# Patient Record
Sex: FEMALE | Race: WHITE | NOT HISPANIC OR LATINO | Employment: OTHER | URBAN - METROPOLITAN AREA
[De-identification: names, ages, dates, MRNs, and addresses within clinical notes are randomized per-mention and may not be internally consistent; named-entity substitution may affect disease eponyms.]

---

## 2017-01-04 ENCOUNTER — HOSPITAL ENCOUNTER (OUTPATIENT)
Dept: GENERAL RADIOLOGY | Facility: HOSPITAL | Age: 57
Discharge: HOME | End: 2017-01-04
Attending: INTERNAL MEDICINE | Admitting: INTERNAL MEDICINE

## 2017-01-04 ENCOUNTER — TRANSCRIBE ORDERS (OUTPATIENT)
Dept: ADMINISTRATIVE | Facility: HOSPITAL | Age: 57
End: 2017-01-04

## 2017-01-04 DIAGNOSIS — M25.551 RIGHT HIP PAIN: ICD-10-CM

## 2017-01-04 DIAGNOSIS — M25.551 RIGHT HIP PAIN: Primary | ICD-10-CM

## 2017-01-04 PROCEDURE — 73501 X-RAY EXAM HIP UNI 1 VIEW: CPT

## 2017-08-21 ENCOUNTER — LAB (OUTPATIENT)
Dept: LAB | Facility: HOSPITAL | Age: 57
End: 2017-08-21

## 2017-08-21 ENCOUNTER — HOSPITAL ENCOUNTER (OUTPATIENT)
Dept: CT IMAGING | Facility: HOSPITAL | Age: 57
Discharge: HOME | End: 2017-08-21
Admitting: NURSE PRACTITIONER

## 2017-08-21 DIAGNOSIS — C50.211 MALIGNANT NEOPLASM OF UPPER-INNER QUADRANT OF RIGHT FEMALE BREAST (HCC): ICD-10-CM

## 2017-08-21 DIAGNOSIS — R91.1 LUNG NODULE: ICD-10-CM

## 2017-08-21 LAB
ALBUMIN SERPL-MCNC: 3.8 G/DL (ref 3.2–4.8)
ALBUMIN/GLOB SERPL: 1.7 G/DL (ref 1.5–2.5)
ALP SERPL-CCNC: 66 U/L (ref 25–100)
ALT SERPL W P-5'-P-CCNC: 17 U/L (ref 7–40)
ANION GAP SERPL CALCULATED.3IONS-SCNC: 4 MMOL/L (ref 3–11)
AST SERPL-CCNC: 20 U/L (ref 0–33)
BILIRUB SERPL-MCNC: 0.5 MG/DL (ref 0.3–1.2)
BUN BLD-MCNC: 21 MG/DL (ref 9–23)
BUN/CREAT SERPL: 30 (ref 7–25)
CALCIUM SPEC-SCNC: 8.8 MG/DL (ref 8.7–10.4)
CHLORIDE SERPL-SCNC: 112 MMOL/L (ref 99–109)
CO2 SERPL-SCNC: 28 MMOL/L (ref 20–31)
CREAT BLD-MCNC: 0.7 MG/DL (ref 0.6–1.3)
GFR SERPL CREATININE-BSD FRML MDRD: 87 ML/MIN/1.73
GLOBULIN UR ELPH-MCNC: 2.3 GM/DL
GLUCOSE BLD-MCNC: 80 MG/DL (ref 70–100)
POTASSIUM BLD-SCNC: 4.1 MMOL/L (ref 3.5–5.5)
PROT SERPL-MCNC: 6.1 G/DL (ref 5.7–8.2)
SODIUM BLD-SCNC: 144 MMOL/L (ref 132–146)

## 2017-08-21 PROCEDURE — 82565 ASSAY OF CREATININE: CPT

## 2017-08-21 PROCEDURE — 80053 COMPREHEN METABOLIC PANEL: CPT | Performed by: NURSE PRACTITIONER

## 2017-08-21 PROCEDURE — 71260 CT THORAX DX C+: CPT

## 2017-08-21 PROCEDURE — 0 IOPAMIDOL 61 % SOLUTION: Performed by: NURSE PRACTITIONER

## 2017-08-21 PROCEDURE — 36415 COLL VENOUS BLD VENIPUNCTURE: CPT

## 2017-08-21 RX ADMIN — IOPAMIDOL 75 ML: 612 INJECTION, SOLUTION INTRAVENOUS at 08:50

## 2017-08-22 LAB — CREAT BLDA-MCNC: 0.8 MG/DL (ref 0.6–1.3)

## 2017-08-28 ENCOUNTER — OFFICE VISIT (OUTPATIENT)
Dept: ONCOLOGY | Facility: CLINIC | Age: 57
End: 2017-08-28

## 2017-08-28 VITALS
BODY MASS INDEX: 27.88 KG/M2 | WEIGHT: 178 LBS | DIASTOLIC BLOOD PRESSURE: 65 MMHG | SYSTOLIC BLOOD PRESSURE: 129 MMHG | RESPIRATION RATE: 17 BRPM | HEART RATE: 76 BPM | TEMPERATURE: 98 F

## 2017-08-28 DIAGNOSIS — C50.211 MALIGNANT NEOPLASM OF UPPER-INNER QUADRANT OF RIGHT FEMALE BREAST (HCC): Primary | ICD-10-CM

## 2017-08-28 PROCEDURE — 99214 OFFICE O/P EST MOD 30 MIN: CPT | Performed by: INTERNAL MEDICINE

## 2017-08-28 NOTE — PROGRESS NOTES
CHIEF COMPLAINT: Dizzy with headache  Problem List:  Oncology/Hematology History    1. Clinical stage IIB T2N1 greater than 2 cm primary, ER positive, UT  negative, HER-2/antonio 3+ breast cancer diagnosed on abnormal mammogram 11/21/2013  with biopsy that day. Received neoadjuvant dose dense Adriamycin and Cytoxan  starting 12/23/2013. Despite the use of Aloxi, Emend, Zofran, and Phenergan  she had significant and protracted nausea and vomiting that was quite  debilitating along with migraine headaches with severe visual scotoma. This  occurred after her 1st course of therapy on 12/23/2013, again after her 2nd  course 01/06/2014 and again after her 3rd course on 01/20/2014 of dose dense  Asael-Cytoxan. Hence, her chemotherapy was switched to Taxotere and Herceptin  starting 02/04/2014. After course #1, day 1 of Taxotere and Herceptin she  developed severe eye swelling to the point where her eyes were nearly shut with  almost a burn-like reaction on the dorsum and lateral aspects symmetrically of  her hands. This was interesting in that she also subsequently developed palmar  and plantar cracking and desquamation but despite continuing the Taxotere and  Herceptin the eye swelling did not recur but she had severe problems with  curling and loss of her eyelashes with conjunctivitis. She had started a dry  cough as well in mid-February 2014 that progressively worsened despite  Claritin, Prilosec, and Amoxil. She received course #2 of Taxotere and  Herceptin starting 03/04/2014 and course #3 on 03/25/2014 and developed such a  severe cough that subsequently CAT scan showed ground-glass opacities in the  lungs, treated with antibiotics, steroids with prednisone, and cessation of  chemo such that by the time of CT 05/06/2014 her ground-glass opacities had  mostly resolved save for some mild micronodular interstitial changes in the  lower lobes persistent but much better than prior CAT scan of 04/10/2014. She  did have some  collateralization of subclavian blood vessel flow due to stenosis  of the subclavian also on that CAT scan:   a) Due to the toxicities relating to her therapy, we just went ahead with  surgery given that she had had an excellent shrinkage of her tumor that was  well larger than 2 cm in size but virtually nonpalpable after the first course  of chemotherapy. She hence underwent right mastectomy with prophylactic left  mastectomy on 05/13/2014. Returned on 06/10/2014 for postoperative followup.   Had pathological [id number] and 0.8 cm right breast residual, Friedman-Huff 6  out of 9 and 0 out of 2 sentinel lymph nodes on the right with benign left  prophylactic mastectomy.  b) Began maintenance Herceptin 06/17/2014.   c) Most recent echocardiogram 05/27/2014 with ejection fraction 55% to 65%.  d) Herceptin weekly fairly well tolerated. Herceptin on hold since 10/07/2014  due to persistent cough and ruling out pneumonitis that could possibly be  caused by Herceptin.   e) Completed consolidative irradiation 09/09/2014.  f) Completed Herceptin 07/28/2015.  g) Began adjuvant tamoxifen 07/2014.  h) Bone scan 11/09/2015 showing some uptake in the right 6th rib anteriorly,  an area of prior surgery, most likely benign and CT chest 02/16/2016 showing  noncalcified 4 mm nodule right lung stable with stable scar in the lung.   2. Probable Taxotere-induced pneumonitis subsequently resolved:   a) Had ground glass abnormalities 04/10/2014 improving and virtually resolved  by 05/06/14 but by 10/2014 had new right upper lobe nodules that were faintly  PET-positive but by 12/2014 had resolved.  b) CT of the chest 09/28/2015 revealed no change in the small pleural based  nodular density in the right midlung anteriorly. Findings suggest  postradiation change.  3. Screening colonoscopy 10/2014 with hyperplastic polyp.        Malignant neoplasm of upper-inner quadrant of right female breast    11/21/2013 Initial Diagnosis     Breast  cancer         8/21/2017 Imaging     CT chest abdomen pelvis with contrast  Impression:     1. Stable scarring in the chest.  2. Over the interval of one year, slight increase in stranding in the  subcutis of the right anterior and anterior lateral chest wall. Please  correlate for significance.  3. Otherwise, progressive pulmonary parenchymal or mediastinal  abnormality is not identif                 HISTORY OF PRESENT ILLNESS:  The patient is a 57 y.o. female, here for follow up on management of breast ca.  On tmx.  Headache and dizziness periodically since July.  Some right retroobital pain with history of right retinal detachments in 2002.      Past Medical History:   Diagnosis Date   • Anesthesia     DIFFICULT TO AWAKEN WITH MOST RECENT SURGERY    • Arthritis    • Back pain    • Breast lump    • Breast pain    • Cataract    • Constipation    • Corneal burn     after chemotherapy   • Ductal carcinoma of right breast, stage 2     Stage yIIA; invasive   • Easy bruising    • Fatigue    • Fractures    • Glaucoma    • H/O mammogram 11/21/2013   • Headache    • Heart murmur    • History of transfusion    • Itching    • Migraine    • Palpitations    • PONV (postoperative nausea and vomiting)    • Positive TB test    • Pruritus     upper arms and chest wall   • Retinal detachment    • Seasonal allergies    • Sinus problem    • TIA (transient ischemic attack) 2008    POSSIBLE VS COMPLICATED MIGRAINE    • Upper arm pain     Right   • Wears glasses      Past Surgical History:   Procedure Laterality Date   • BREAST BIOPSY  11/2013   • BREAST IMPLANT SURGERY Bilateral 10/21/2016    Procedure:  Revision of bilateral breast with removal of redundant skin interfering with use of external prosthetic;  Surgeon: Amanda Samuel MD;  Location: [facility];  Service:    • MASTECTOMY Bilateral 05/13/2014   • PORTACATH PLACEMENT  12/2013   • NE EXCISE EXCESS SKIN TISSUE,ABDOMEN, ADD-ON N/A 10/21/2016    Procedure: ABDOMINOPLASTY;   Surgeon: mAanda Samuel MD;  Location: [facility];  Service: Plastics   • RETINAL DETACHMENT REPAIR Bilateral 07/2002   • SHOULDER ACROMIOPLASTY WITH ROTATOR CUFF REPAIR Right 01/1993   • TONSILLECTOMY  1969   • VENOUS ACCESS DEVICE (PORT) REMOVAL  09/2015       No Known Allergies    Family History and Social History reviewed and changed as necessary      REVIEW OF SYSTEM:   Review of Systems   Constitutional: Negative for appetite change, chills, diaphoresis, fatigue, fever and unexpected weight change.   HENT:   Negative for mouth sores, sore throat and trouble swallowing.    Eyes: Negative for icterus.   Respiratory: Negative for cough, hemoptysis and shortness of breath.    Cardiovascular: Negative for chest pain, leg swelling and palpitations.   Gastrointestinal: Negative for abdominal distention, abdominal pain, blood in stool, constipation, diarrhea, nausea and vomiting.   Endocrine: Negative for hot flashes.   Genitourinary: Negative for bladder incontinence, difficulty urinating, dysuria, frequency and hematuria.    Musculoskeletal: Negative for gait problem, neck pain and neck stiffness.   Skin: Negative for rash.   Neurological: Negative for dizziness, gait problem, headaches, light-headedness and numbness.   Hematological: Negative for adenopathy. Does not bruise/bleed easily.   Psychiatric/Behavioral: Negative for depression. The patient is not nervous/anxious.    All other systems reviewed and are negative.       PHYSICAL EXAM    Vitals:    08/28/17 0921   BP: 129/65   Pulse: 76   Resp: 17   Temp: 98 °F (36.7 °C)   TempSrc: Temporal Artery    Weight: 178 lb (80.7 kg)     Constitutional: Appears well-developed and well-nourished. No distress.   ECOG: (0) Fully active, able to carry on all predisease performance without restriction  HENT:   Head: Normocephalic.   Mouth/Throat: Oropharynx is clear and moist.   Eyes: Conjunctivae are normal. Pupils are equal, round, and reactive to light. No scleral  icterus.   Neck: Neck supple. No JVD present. No thyromegaly present.   Cardiovascular: Normal rate, regular rhythm and normal heart sounds.    Pulmonary/Chest: Breath sounds normal. No respiratory distress.   Abdominal: Soft. Exhibits no distension and no mass. There is no hepatosplenomegaly. There is no tenderness. There is no rebound and no guarding.   Musculoskeletal:Exhibits no edema, tenderness or deformity.   Neurological: Alert and oriented to person, place, and time. Exhibits normal muscle tone.   Skin: No ecchymosis, no petechiae and no rash noted. Not diaphoretic. No cyanosis. Nails show no clubbing.   Psychiatric: Normal mood and affect.   Vitals reviewed.  Bilateral chest wall benign    Lab on 08/21/2017   Component Date Value Ref Range Status   • Glucose 08/21/2017 80  70 - 100 mg/dL Final   • BUN 08/21/2017 21  9 - 23 mg/dL Final   • Creatinine 08/21/2017 0.70  0.60 - 1.30 mg/dL Final   • Sodium 08/21/2017 144  132 - 146 mmol/L Final   • Potassium 08/21/2017 4.1  3.5 - 5.5 mmol/L Final   • Chloride 08/21/2017 112* 99 - 109 mmol/L Final   • CO2 08/21/2017 28.0  20.0 - 31.0 mmol/L Final   • Calcium 08/21/2017 8.8  8.7 - 10.4 mg/dL Final   • Total Protein 08/21/2017 6.1  5.7 - 8.2 g/dL Final   • Albumin 08/21/2017 3.80  3.20 - 4.80 g/dL Final   • ALT (SGPT) 08/21/2017 17  7 - 40 U/L Final   • AST (SGOT) 08/21/2017 20  0 - 33 U/L Final   • Alkaline Phosphatase 08/21/2017 66  25 - 100 U/L Final   • Total Bilirubin 08/21/2017 0.5  0.3 - 1.2 mg/dL Final   • eGFR Non  Amer 08/21/2017 87  >60 mL/min/1.73 Final   • Globulin 08/21/2017 2.3  gm/dL Final   • A/G Ratio 08/21/2017 1.7  1.5 - 2.5 g/dL Final   • BUN/Creatinine Ratio 08/21/2017 30.0* 7.0 - 25.0 Final   • Anion Gap 08/21/2017 4.0  3.0 - 11.0 mmol/L Final   Hospital Outpatient Visit on 08/21/2017   Component Date Value Ref Range Status   • Creatinine 08/21/2017 0.80  0.60 - 1.30 mg/dL Final    Serial Number: [id number]:  [id number]        Assessment/Plan     1.  Breast Ca  2. Headache  3. Dizziness    Discussion: Check MRI Brain.  Chest wall thickening on CT CAP otherwise negative. This almost assuredly is post op and post Rt changes but will repeat CT chest in 6 months. Continue TMX 10 years.      Yonathan Mata MD    08/28/2017

## 2017-08-31 ENCOUNTER — HOSPITAL ENCOUNTER (OUTPATIENT)
Dept: MRI IMAGING | Facility: HOSPITAL | Age: 57
Discharge: HOME | End: 2017-08-31
Attending: INTERNAL MEDICINE | Admitting: INTERNAL MEDICINE

## 2017-08-31 DIAGNOSIS — C50.211 MALIGNANT NEOPLASM OF UPPER-INNER QUADRANT OF RIGHT FEMALE BREAST (HCC): ICD-10-CM

## 2017-08-31 PROCEDURE — 70553 MRI BRAIN STEM W/O & W/DYE: CPT

## 2017-08-31 PROCEDURE — 0 GADOBENATE DIMEGLUMINE 529 MG/ML SOLUTION: Performed by: INTERNAL MEDICINE

## 2017-08-31 PROCEDURE — A9577 INJ MULTIHANCE: HCPCS | Performed by: INTERNAL MEDICINE

## 2017-08-31 RX ADMIN — GADOBENATE DIMEGLUMINE 15 ML: 529 INJECTION, SOLUTION INTRAVENOUS at 09:30

## 2017-09-01 ENCOUNTER — TELEPHONE (OUTPATIENT)
Dept: ONCOLOGY | Facility: CLINIC | Age: 57
End: 2017-09-01

## 2017-09-29 RX ORDER — TAMOXIFEN CITRATE 20 MG/1
TABLET ORAL
Qty: 90 TABLET | Refills: 4 | Status: SHIPPED | OUTPATIENT
Start: 2017-09-29 | End: 2018-12-24 | Stop reason: SDUPTHER

## 2018-02-22 ENCOUNTER — HOSPITAL ENCOUNTER (OUTPATIENT)
Dept: CT IMAGING | Facility: HOSPITAL | Age: 58
Discharge: HOME | End: 2018-02-22
Attending: INTERNAL MEDICINE | Admitting: INTERNAL MEDICINE

## 2018-02-22 DIAGNOSIS — C50.211 MALIGNANT NEOPLASM OF UPPER-INNER QUADRANT OF RIGHT FEMALE BREAST (HCC): ICD-10-CM

## 2018-02-22 PROCEDURE — 82565 ASSAY OF CREATININE: CPT

## 2018-02-22 PROCEDURE — 0 IOPAMIDOL 61 % SOLUTION: Performed by: INTERNAL MEDICINE

## 2018-02-22 PROCEDURE — 71260 CT THORAX DX C+: CPT

## 2018-02-22 RX ADMIN — IOPAMIDOL 80 ML: 612 INJECTION, SOLUTION INTRAVENOUS at 09:04

## 2018-02-23 LAB — CREAT BLDA-MCNC: 0.8 MG/DL (ref 0.6–1.3)

## 2018-02-27 ENCOUNTER — LAB (OUTPATIENT)
Dept: LAB | Facility: HOSPITAL | Age: 58
End: 2018-02-27

## 2018-02-27 ENCOUNTER — OFFICE VISIT (OUTPATIENT)
Dept: ONCOLOGY | Facility: CLINIC | Age: 58
End: 2018-02-27

## 2018-02-27 VITALS
HEART RATE: 75 BPM | SYSTOLIC BLOOD PRESSURE: 123 MMHG | BODY MASS INDEX: 27.31 KG/M2 | DIASTOLIC BLOOD PRESSURE: 56 MMHG | WEIGHT: 174 LBS | RESPIRATION RATE: 16 BRPM | TEMPERATURE: 97.7 F | HEIGHT: 67 IN

## 2018-02-27 DIAGNOSIS — R93.89 ABNORMAL FINDING ON CT SCAN: ICD-10-CM

## 2018-02-27 DIAGNOSIS — Z85.3 HISTORY OF RIGHT BREAST CANCER: ICD-10-CM

## 2018-02-27 DIAGNOSIS — Z17.0 MALIGNANT NEOPLASM OF UPPER-INNER QUADRANT OF RIGHT BREAST IN FEMALE, ESTROGEN RECEPTOR POSITIVE (HCC): ICD-10-CM

## 2018-02-27 DIAGNOSIS — C50.211 MALIGNANT NEOPLASM OF UPPER-INNER QUADRANT OF RIGHT BREAST IN FEMALE, ESTROGEN RECEPTOR POSITIVE (HCC): ICD-10-CM

## 2018-02-27 DIAGNOSIS — Z13.820 OSTEOPOROSIS SCREENING: Primary | ICD-10-CM

## 2018-02-27 LAB
ALBUMIN SERPL-MCNC: 4 G/DL (ref 3.2–4.8)
ALBUMIN/GLOB SERPL: 1.8 G/DL (ref 1.5–2.5)
ALP SERPL-CCNC: 84 U/L (ref 25–100)
ALT SERPL W P-5'-P-CCNC: 13 U/L (ref 7–40)
ANION GAP SERPL CALCULATED.3IONS-SCNC: 3 MMOL/L (ref 3–11)
AST SERPL-CCNC: 18 U/L (ref 0–33)
BILIRUB SERPL-MCNC: 0.6 MG/DL (ref 0.3–1.2)
BUN BLD-MCNC: 12 MG/DL (ref 9–23)
BUN/CREAT SERPL: 15 (ref 7–25)
CALCIUM SPEC-SCNC: 9 MG/DL (ref 8.7–10.4)
CHLORIDE SERPL-SCNC: 108 MMOL/L (ref 99–109)
CO2 SERPL-SCNC: 30 MMOL/L (ref 20–31)
CREAT BLD-MCNC: 0.8 MG/DL (ref 0.6–1.3)
GFR SERPL CREATININE-BSD FRML MDRD: 74 ML/MIN/1.73
GLOBULIN UR ELPH-MCNC: 2.2 GM/DL
GLUCOSE BLD-MCNC: 84 MG/DL (ref 70–100)
POTASSIUM BLD-SCNC: 4.5 MMOL/L (ref 3.5–5.5)
PROT SERPL-MCNC: 6.2 G/DL (ref 5.7–8.2)
SODIUM BLD-SCNC: 141 MMOL/L (ref 132–146)

## 2018-02-27 PROCEDURE — 80053 COMPREHEN METABOLIC PANEL: CPT

## 2018-02-27 PROCEDURE — 99214 OFFICE O/P EST MOD 30 MIN: CPT | Performed by: NURSE PRACTITIONER

## 2018-02-27 PROCEDURE — 36415 COLL VENOUS BLD VENIPUNCTURE: CPT

## 2018-02-27 NOTE — PROGRESS NOTES
CHIEF COMPLAINT: Follow-up for breast cancer    Problem List:  Oncology/Hematology History    1. Clinical stage IIB T2N1 greater than 2 cm primary, ER positive, [place]  negative, HER-2/antonio 3+ breast cancer diagnosed on abnormal mammogram 11/21/2013  with biopsy that day. Received neoadjuvant dose dense Adriamycin and Cytoxan  starting 12/23/2013. Despite the use of Aloxi, Emend, Zofran, and Phenergan  she had significant and protracted nausea and vomiting that was quite  debilitating along with migraine headaches with severe visual scotoma. This  occurred after her 1st course of therapy on 12/23/2013, again after her 2nd  course 01/06/2014 and again after her 3rd course on 01/20/2014 of dose dense  Asael-Cytoxan. Hence, her chemotherapy was switched to Taxotere and Herceptin  starting 02/04/2014. After course #1, day 1 of Taxotere and Herceptin she  developed severe eye swelling to the point where her eyes were nearly shut with  almost a burn-like reaction on the dorsum and lateral aspects symmetrically of  her hands. This was interesting in that she also subsequently developed palmar  and plantar cracking and desquamation but despite continuing the Taxotere and  Herceptin the eye swelling did not recur but she had severe problems with  curling and loss of her eyelashes with conjunctivitis. She had started a dry  cough as well in mid-February 2014 that progressively worsened despite  Claritin, Prilosec, and Amoxil. She received course #2 of Taxotere and  Herceptin starting 03/04/2014 and course #3 on 03/25/2014 and developed such a  severe cough that subsequently CAT scan showed ground-glass opacities in the  lungs, treated with antibiotics, steroids with prednisone, and cessation of  chemo such that by the time of CT 05/06/2014 her ground-glass opacities had  mostly resolved save for some mild micronodular interstitial changes in the  lower lobes persistent but much better than prior CAT scan of 04/10/2014. She  did  have some collateralization of subclavian blood vessel flow due to stenosis  of the subclavian also on that CAT scan:   a) Due to the toxicities relating to her therapy, we just went ahead with  surgery given that she had had an excellent shrinkage of her tumor that was  well larger than 2 cm in size but virtually nonpalpable after the first course  of chemotherapy. She hence underwent right mastectomy with prophylactic left  mastectomy on 05/13/2014. Returned on 06/10/2014 for postoperative followup.   Had pathological [id number] and 0.8 cm right breast residual, Friedman-Huff 6  out of 9 and 0 out of 2 sentinel lymph nodes on the right with benign left  prophylactic mastectomy.  b) Began maintenance Herceptin 06/17/2014.   c) Most recent echocardiogram 05/27/2014 with ejection fraction 55% to 65%.  d) Herceptin weekly fairly well tolerated. Herceptin on hold since 10/07/2014  due to persistent cough and ruling out pneumonitis that could possibly be  caused by Herceptin.   e) Completed consolidative irradiation 09/09/2014.  f) Completed Herceptin 07/28/2015.  g) Began adjuvant tamoxifen 07/2014.  h) Bone scan 11/09/2015 showing some uptake in the right 6th rib anteriorly,  an area of prior surgery, most likely benign and CT chest 02/16/2016 showing  noncalcified 4 mm nodule right lung stable with stable scar in the lung.   2. Probable Taxotere-induced pneumonitis subsequently resolved:   a) Had ground glass abnormalities 04/10/2014 improving and virtually resolved  by 05/06/14 but by 10/2014 had new right upper lobe nodules that were faintly  PET-positive but by 12/2014 had resolved.  b) CT of the chest 09/28/2015 revealed no change in the small pleural based  nodular density in the right midlung anteriorly. Findings suggest  postradiation change.  3. Screening colonoscopy 10/2014 with hyperplastic polyp.        Malignant neoplasm of upper-inner quadrant of right female breast    11/21/2013 Initial Diagnosis      Right breast cancer         8/21/2017 Imaging     CT chest abdomen pelvis with contrast  Impression:     1. Stable scarring in the chest.  2. Over the interval of one year, slight increase in stranding in the  subcutis of the right anterior and anterior lateral chest wall. Please  correlate for significance.  3. Otherwise, progressive pulmonary parenchymal or mediastinal  abnormality is not identif              8/31/2017 Imaging     MRI brain negative.         2/22/2018 Imaging     CT chest IMPRESSION:  1. Mild chronic appearing interstitial lung changes including trace new  interstitial disease in the right upper lobe. No findings particularly  worrisome for metastatic disease are appreciated.  2. Appearance of a catheter or catheter fragment along the left  mediastinal margin, with no evidence of any associated abnormality.  Please correlate with the patient's history.            HISTORY OF PRESENT ILLNESS:  The patient is a 58 y.o. female, here for follow up on management of History of right breast cancer.  Rivka has been doing well since we saw her last with no new complaints.  She states overall she actually is feeling much better.  She states that she has made some dietary changes and is eating healthier, this seemed take away a lot of the arthralgias she was having.  She no longer takes meloxicam.  She has had no new or concerning findings on chest wall exam and no new or worrisome bony aches or pains.  Has no unusual cough or shortness of breath.  Appetite is normal.  No change in her bowel or bladder habits.  She has had no surgeries or procedures.      Past Medical History:   Diagnosis Date   • Anesthesia     DIFFICULT TO AWAKEN WITH MOST RECENT SURGERY    • Arthritis    • Back pain    • Breast lump    • Breast pain    • Cataract    • Constipation    • Corneal burn     after chemotherapy   • Ductal carcinoma of right breast, stage 2     Stage yIIA; invasive   • Easy bruising    • Fatigue    • Fractures     • Glaucoma    • H/O mammogram 11/21/2013   • Headache    • Heart murmur    • History of transfusion    • Itching    • Migraine    • Palpitations    • PONV (postoperative nausea and vomiting)    • Positive TB test    • Pruritus     upper arms and chest wall   • Retinal detachment    • Seasonal allergies    • Sinus problem    • TIA (transient ischemic attack) 2008    POSSIBLE VS COMPLICATED MIGRAINE    • Upper arm pain     Right   • Wears glasses      Past Surgical History:   Procedure Laterality Date   • BREAST BIOPSY  11/2013   • BREAST IMPLANT SURGERY Bilateral 10/21/2016    Procedure:  Revision of bilateral breast with removal of redundant skin interfering with use of external prosthetic;  Surgeon: Amanda Samuel MD;  Location:  [facility];  Service:    • MASTECTOMY Bilateral 05/13/2014   • PORTACATH PLACEMENT  12/2013   • NC EXCISE EXCESS SKIN TISSUE,ABDOMEN, ADD-ON N/A 10/21/2016    Procedure: ABDOMINOPLASTY;  Surgeon: Amanda Samuel MD;  Location:  [facility];  Service: Plastics   • RETINAL DETACHMENT REPAIR Bilateral 07/2002   • SHOULDER ACROMIOPLASTY WITH ROTATOR CUFF REPAIR Right 01/1993   • TONSILLECTOMY  1969   • VENOUS ACCESS DEVICE (PORT) REMOVAL  09/2015       No Known Allergies    Family History and Social History reviewed and changed as necessary      REVIEW OF SYSTEM:   Review of Systems   Constitutional: Negative for appetite change, chills, diaphoresis, fatigue, fever and unexpected weight change.   HENT:   Negative for mouth sores, sore throat and trouble swallowing.    Eyes: Negative for icterus.   Respiratory: Negative for cough, hemoptysis and shortness of breath.    Cardiovascular: Negative for chest pain, leg swelling and palpitations.   Gastrointestinal: Negative for abdominal distention, abdominal pain, blood in stool, constipation, diarrhea, nausea and vomiting.   Endocrine: Negative for hot flashes.   Genitourinary: Negative for bladder incontinence, difficulty urinating, dysuria,  "frequency and hematuria.    Musculoskeletal: Negative for gait problem, neck pain and neck stiffness.   Skin: Negative for rash.   Neurological: Negative for dizziness, gait problem, headaches, light-headedness and numbness.   Hematological: Negative for adenopathy. Does not bruise/bleed easily.   Psychiatric/Behavioral: Negative for depression. The patient is not nervous/anxious.    All other systems reviewed and are negative.       PHYSICAL EXAM    Vitals:    02/27/18 0858   BP: 123/56   Pulse: 75   Resp: 16   Temp: 97.7 °F (36.5 °C)   TempSrc: Temporal Artery    Weight: 78.9 kg (174 lb)   Height: 170.2 cm (67\")     Constitutional: Appears well-developed and well-nourished. No distress.   ECOG: (0) Fully active, able to carry on all predisease performance without restriction  HENT:   Head: Normocephalic.   Mouth/Throat: Oropharynx is clear and moist.   Eyes: Conjunctivae are normal. Pupils are equal, round, and reactive to light. No scleral icterus.   Neck: Neck supple. No JVD present. No thyromegaly present.   Cardiovascular: Normal rate, regular rhythm and normal heart sounds.  Chest: Bilateral chest exam is benign status post mastectomies, skin tissue is soft, no abnormal masses, nodules, rashes or lesions.  No tenderness on palpation.  Nodes: No cervical, supraclavicular or axillary nodes palpable on exam.    Pulmonary/Chest: Breath sounds normal. No respiratory distress.   Abdominal: Soft. Exhibits no distension and no mass. There is no hepatosplenomegaly. There is no tenderness. There is no rebound and no guarding.   Musculoskeletal:Exhibits no edema, tenderness or deformity.   Neurological: Alert and oriented to person, place, and time. Exhibits normal muscle tone.   Skin: No ecchymosis, no petechiae and no rash noted. Not diaphoretic. No cyanosis. Nails show no clubbing.   Psychiatric: Normal mood and affect.   Vitals reviewed.  Diagnostic data: All previous office notes and current radiology reports and " "images thereof were reviewed at time of visit with the patient.  I also called and discussed CT findings with Dr. Yonathan Mata via telephone.    Ct Chest With Contrast    Result Date: 2/22/2018  1. Mild chronic appearing interstitial lung changes including trace new interstitial disease in the right upper lobe. No findings particularly worrisome for metastatic disease are appreciated. 2. Appearance of a catheter or catheter fragment along the left mediastinal margin, with no evidence of any associated abnormality. Please correlate with the patient's history.  D:  02/22/2018 E:  02/22/2018    This report was finalized on 2/22/2018 10:49 PM by DR. Yoandy Moreno MD.        Assessment/Plan     1.  History of right breast cancer  2.  Abnormal findings on chest CT    Discussion: No evidence of disease on clinical exam and no new worrisome symptoms.  She is tolerating tamoxifen without difficulty.  She is up-to-date on routine pelvic exams.  She has had no spotting, remains amenorrheic.  I will repeat CMP today but previous CMP's have been normal, we will continue to monitor annually while on tamoxifen.  We plan on continuing tamoxifen for 10 years of extended adjuvant therapy until July 2024.  I will get a bone density test for baseline as she has never had one, tamoxifen in premenopausal females can occasionally cause osteoporosis but is more bone protectant in postmenopausal women.  I doubt that she is still premenopausal.  In regards to the abnormal finding on CT with the \"appearance of a catheter or catheter fragment along the left mediastinal margin\" I did discuss these findings with Dr. Yonathan Mata via telephone in the also reviewed the images with me, we will refer Rivka to Dr. Jevon Dunn for further evaluation and recommendation.  She is completely asymptomatic, she has had no procedures since we saw her last, it does mention that this is a new finding from previous CT scan that was done in August.  We will see her " back in 1 year for follow-up.      Marcia Sarabia, APRN    02/27/2018

## 2018-03-12 ENCOUNTER — HOSPITAL ENCOUNTER (OUTPATIENT)
Dept: BONE DENSITY | Facility: HOSPITAL | Age: 58
Discharge: HOME | End: 2018-03-12
Admitting: NURSE PRACTITIONER

## 2018-03-12 DIAGNOSIS — Z13.820 OSTEOPOROSIS SCREENING: ICD-10-CM

## 2018-03-12 PROCEDURE — 77080 DXA BONE DENSITY AXIAL: CPT

## 2018-03-13 ENCOUNTER — TELEPHONE (OUTPATIENT)
Dept: ONCOLOGY | Facility: CLINIC | Age: 58
End: 2018-03-13

## 2018-03-13 NOTE — TELEPHONE ENCOUNTER
Called Rivka with results of DEXA showing osteopenia.  She will begin taking calcium 1200mg/day along with Vitamin D 0425-3242 IU/day.

## 2018-12-24 RX ORDER — TAMOXIFEN CITRATE 20 MG/1
TABLET ORAL
Qty: 90 TABLET | Refills: 4 | Status: SHIPPED | OUTPATIENT
Start: 2018-12-24 | End: 2020-03-03 | Stop reason: SDUPTHER

## 2019-02-13 ENCOUNTER — TELEPHONE (OUTPATIENT)
Dept: ONCOLOGY | Facility: CLINIC | Age: 59
End: 2019-02-13

## 2019-02-13 DIAGNOSIS — R93.89 ABNORMAL FINDING ON CT SCAN: ICD-10-CM

## 2019-02-13 DIAGNOSIS — Z17.0 MALIGNANT NEOPLASM OF UPPER-INNER QUADRANT OF RIGHT BREAST IN FEMALE, ESTROGEN RECEPTOR POSITIVE (HCC): Primary | ICD-10-CM

## 2019-02-13 DIAGNOSIS — C50.211 MALIGNANT NEOPLASM OF UPPER-INNER QUADRANT OF RIGHT BREAST IN FEMALE, ESTROGEN RECEPTOR POSITIVE (HCC): Primary | ICD-10-CM

## 2019-02-13 NOTE — TELEPHONE ENCOUNTER
Order entered.  Patient notified.  Message sent to Tanesha to schedule prior to next office visit on 3-4-19.

## 2019-02-13 NOTE — TELEPHONE ENCOUNTER
----- Message from Yonathan Mata MD sent at 2/12/2019  3:14 PM EST -----  Regarding: RE: [facility] - CT SCAN   Contact: [phone]  Get CT chest with contrast PTR.  ----- Message -----  From: Faye Christopher RN  Sent: 2/12/2019   9:35 AM  To: Yonathan Mata MD  Subject: FW: [facility] - CT SCAN                              I can't tell from Marcia's note if she needs a CT.  Can you review chart and let me know, please?  Thanks!  ----- Message -----  From: Odessa Rivera  Sent: 2/12/2019   8:42 AM  To: Mge Onc [facility] Nurse [place]  Subject: [facility] - CT SCAN                                  PATIENT CALLED ASKING ABOUT A CT SCAN. HER LAST ONE WAS 02/22/2018. SHE SAID SHE SHOULD BE DUE FOR ANOTHER BEFORE HER NEXT VISIT WHICH IS 03/04/2019.     PLEASE PUT IN EPIC, AND LET PATIENT KNOW THAT SOMEONE WILL BE CALLING TO GET SCHEDULED.

## 2019-02-15 ENCOUNTER — HOSPITAL ENCOUNTER (OUTPATIENT)
Dept: CT IMAGING | Facility: HOSPITAL | Age: 59
Discharge: HOME | End: 2019-02-15
Admitting: INTERNAL MEDICINE

## 2019-02-15 DIAGNOSIS — R93.89 ABNORMAL FINDING ON CT SCAN: ICD-10-CM

## 2019-02-15 DIAGNOSIS — Z17.0 MALIGNANT NEOPLASM OF UPPER-INNER QUADRANT OF RIGHT BREAST IN FEMALE, ESTROGEN RECEPTOR POSITIVE (HCC): ICD-10-CM

## 2019-02-15 DIAGNOSIS — C50.211 MALIGNANT NEOPLASM OF UPPER-INNER QUADRANT OF RIGHT BREAST IN FEMALE, ESTROGEN RECEPTOR POSITIVE (HCC): ICD-10-CM

## 2019-02-15 PROCEDURE — 25010000002 IOPAMIDOL 61 % SOLUTION: Performed by: INTERNAL MEDICINE

## 2019-02-15 PROCEDURE — 82565 ASSAY OF CREATININE: CPT

## 2019-02-15 PROCEDURE — 71260 CT THORAX DX C+: CPT

## 2019-02-15 RX ADMIN — IOPAMIDOL 75 ML: 612 INJECTION, SOLUTION INTRAVENOUS at 16:05

## 2019-02-18 LAB — CREAT BLDA-MCNC: 0.9 MG/DL (ref 0.6–1.3)

## 2019-03-04 ENCOUNTER — LAB (OUTPATIENT)
Dept: LAB | Facility: HOSPITAL | Age: 59
End: 2019-03-04

## 2019-03-04 ENCOUNTER — OFFICE VISIT (OUTPATIENT)
Dept: ONCOLOGY | Facility: CLINIC | Age: 59
End: 2019-03-04

## 2019-03-04 VITALS
SYSTOLIC BLOOD PRESSURE: 122 MMHG | DIASTOLIC BLOOD PRESSURE: 76 MMHG | WEIGHT: 177 LBS | HEART RATE: 85 BPM | OXYGEN SATURATION: 94 % | TEMPERATURE: 98 F | HEIGHT: 67 IN | RESPIRATION RATE: 16 BRPM | BODY MASS INDEX: 27.78 KG/M2

## 2019-03-04 DIAGNOSIS — R91.1 LUNG NODULE: Primary | ICD-10-CM

## 2019-03-04 DIAGNOSIS — R91.1 LUNG NODULE: ICD-10-CM

## 2019-03-04 DIAGNOSIS — Z85.3 HISTORY OF RIGHT BREAST CANCER: ICD-10-CM

## 2019-03-04 DIAGNOSIS — Z17.0 MALIGNANT NEOPLASM OF UPPER-INNER QUADRANT OF RIGHT BREAST IN FEMALE, ESTROGEN RECEPTOR POSITIVE (HCC): ICD-10-CM

## 2019-03-04 DIAGNOSIS — J84.9 INTERSTITIAL LUNG DISEASE (HCC): ICD-10-CM

## 2019-03-04 DIAGNOSIS — C50.211 MALIGNANT NEOPLASM OF UPPER-INNER QUADRANT OF RIGHT BREAST IN FEMALE, ESTROGEN RECEPTOR POSITIVE (HCC): ICD-10-CM

## 2019-03-04 DIAGNOSIS — Z85.3 HISTORY OF BREAST CANCER: ICD-10-CM

## 2019-03-04 DIAGNOSIS — Z79.899 HIGH RISK MEDICATIONS (NOT ANTICOAGULANTS) LONG-TERM USE: Primary | ICD-10-CM

## 2019-03-04 LAB
ALBUMIN SERPL-MCNC: 4.02 G/DL (ref 3.2–4.8)
ALBUMIN/GLOB SERPL: 2.1 G/DL (ref 1.5–2.5)
ALP SERPL-CCNC: 77 U/L (ref 25–100)
ALT SERPL W P-5'-P-CCNC: 10 U/L (ref 7–40)
ANION GAP SERPL CALCULATED.3IONS-SCNC: 10 MMOL/L (ref 3–11)
AST SERPL-CCNC: 15 U/L (ref 0–33)
BILIRUB SERPL-MCNC: 0.5 MG/DL (ref 0.3–1.2)
BUN BLD-MCNC: 22 MG/DL (ref 9–23)
BUN/CREAT SERPL: 24.4 (ref 7–25)
CALCIUM SPEC-SCNC: 9 MG/DL (ref 8.7–10.4)
CHLORIDE SERPL-SCNC: 108 MMOL/L (ref 99–109)
CO2 SERPL-SCNC: 26 MMOL/L (ref 20–31)
CREAT BLD-MCNC: 0.9 MG/DL (ref 0.6–1.3)
GFR SERPL CREATININE-BSD FRML MDRD: 65 ML/MIN/1.73
GLOBULIN UR ELPH-MCNC: 1.9 GM/DL
GLUCOSE BLD-MCNC: 79 MG/DL (ref 70–100)
POTASSIUM BLD-SCNC: 4.6 MMOL/L (ref 3.5–5.5)
PROT SERPL-MCNC: 5.9 G/DL (ref 5.7–8.2)
SODIUM BLD-SCNC: 144 MMOL/L (ref 132–146)

## 2019-03-04 PROCEDURE — 36415 COLL VENOUS BLD VENIPUNCTURE: CPT

## 2019-03-04 PROCEDURE — 99213 OFFICE O/P EST LOW 20 MIN: CPT | Performed by: NURSE PRACTITIONER

## 2019-03-04 PROCEDURE — 80053 COMPREHEN METABOLIC PANEL: CPT

## 2019-03-04 NOTE — PROGRESS NOTES
CHIEF COMPLAINT: Follow-up for breast cancer    Problem List:  Oncology/Hematology History    1. Clinical stage IIB T2N1 greater than 2 cm primary, ER positive, NE  negative, HER-2/antonio 3+ breast cancer diagnosed on abnormal mammogram 11/21/2013  with biopsy that day. Received neoadjuvant dose dense Adriamycin and Cytoxan  starting 12/23/2013. Despite the use of Aloxi, Emend, Zofran, and Phenergan  she had significant and protracted nausea and vomiting that was quite  debilitating along with migraine headaches with severe visual scotoma. This  occurred after her 1st course of therapy on 12/23/2013, again after her 2nd  course 01/06/2014 and again after her 3rd course on 01/20/2014 of dose dense  Asael-Cytoxan. Hence, her chemotherapy was switched to Taxotere and Herceptin  starting 02/04/2014. After course #1, day 1 of Taxotere and Herceptin she  developed severe eye swelling to the point where her eyes were nearly shut with  almost a burn-like reaction on the dorsum and lateral aspects symmetrically of  her hands. This was interesting in that she also subsequently developed palmar  and plantar cracking and desquamation but despite continuing the Taxotere and  Herceptin the eye swelling did not recur but she had severe problems with  curling and loss of her eyelashes with conjunctivitis. She had started a dry  cough as well in mid-February 2014 that progressively worsened despite  Claritin, Prilosec, and Amoxil. She received course #2 of Taxotere and  Herceptin starting 03/04/2014 and course #3 on 03/25/2014 and developed such a  severe cough that subsequently CAT scan showed ground-glass opacities in the  lungs, treated with antibiotics, steroids with prednisone, and cessation of  chemo such that by the time of CT 05/06/2014 her ground-glass opacities had  mostly resolved save for some mild micronodular interstitial changes in the  lower lobes persistent but much better than prior CAT scan of 04/10/2014. She  did  have some collateralization of subclavian blood vessel flow due to stenosis  of the subclavian also on that CAT scan:   a) Due to the toxicities relating to her therapy, we just went ahead with  surgery given that she had had an excellent shrinkage of her tumor that was  well larger than 2 cm in size but virtually nonpalpable after the first course  of chemotherapy. She hence underwent right mastectomy with prophylactic left  mastectomy on 05/13/2014. Returned on 06/10/2014 for postoperative followup.   Had pathological [id number] and 0.8 cm right breast residual, Friedman-Huff 6  out of 9 and 0 out of 2 sentinel lymph nodes on the right with benign left  prophylactic mastectomy.  b) Began maintenance Herceptin 06/17/2014.   c) Most recent echocardiogram 05/27/2014 with ejection fraction 55% to 65%.  d) Herceptin weekly fairly well tolerated. Herceptin on hold since 10/07/2014  due to persistent cough and ruling out pneumonitis that could possibly be  caused by Herceptin.   e) Completed consolidative irradiation 09/09/2014.  f) Completed Herceptin 07/28/2015.  g) Began adjuvant tamoxifen 07/2014.  h) Bone scan 11/09/2015 showing some uptake in the right 6th rib anteriorly,  an area of prior surgery, most likely benign and CT chest 02/16/2016 showing  noncalcified 4 mm nodule right lung stable with stable scar in the lung.   2. Probable Taxotere-induced pneumonitis subsequently resolved:   a) Had ground glass abnormalities 04/10/2014 improving and virtually resolved  by 05/06/14 but by 10/2014 had new right upper lobe nodules that were faintly  PET-positive but by 12/2014 had resolved.  b) CT of the chest 09/28/2015 revealed no change in the small pleural based  nodular density in the right midlung anteriorly. Findings suggest  postradiation change.  3. Screening colonoscopy 10/2014 with hyperplastic polyp.        Malignant neoplasm of upper-inner quadrant of right female breast (CMS/HCC)    11/21/2013 Initial  Diagnosis     Right breast cancer         8/21/2017 Imaging     CT chest abdomen pelvis with contrast  Impression:     1. Stable scarring in the chest.  2. Over the interval of one year, slight increase in stranding in the  subcutis of the right anterior and anterior lateral chest wall. Please  correlate for significance.  3. Otherwise, progressive pulmonary parenchymal or mediastinal  abnormality is not identif              8/31/2017 Imaging     MRI brain negative.         2/22/2018 Imaging     CT chest IMPRESSION:  1. Mild chronic appearing interstitial lung changes including trace new  interstitial disease in the right upper lobe. No findings particularly  worrisome for metastatic disease are appreciated.  2. Appearance of a catheter or catheter fragment along the left  mediastinal margin, with no evidence of any associated abnormality.  Please correlate with the patient's history.         3/12/2018 Imaging     DEXA bone density testing   RESULTS:     Lumbar Spine:  The BMD measured in the L1-L4 region is 1.040 g/cm2.  The  average T-score is -0.1.  The Z-score is 1.1.     Total Hip:  The BMD measured at the left total proximal femur is 0.886  g/cm2.  The T-score is -0.5.  The Z-score is 0.3.     Femoral Neck:  The BMD measured at the left femoral neck is 0.785 g/cm2.   The T-score is -0.6.  The Z-score is 0.5.     Total Hip:  The BMD measured at the right total proximal femur is 0.838  g/cm2.  The T-score is -0.9.  The Z-score is -0.1.     Femoral neck: The BMD measured at the right femoral neck is 0.719 g/cm2.  The T score is -1.2. The Z score is -0.1.     IMPRESSION:  Osteopenia of the right femoral neck.         2/15/2019 Imaging     CT chest IMPRESSION:  Stable chest CT scan including mild pulmonary interstitial  changes and 3-4 mm right lower lobe pulmonary nodule. No new or  progressive chest disease is identified.            HISTORY OF PRESENT ILLNESS:  The patient is a 59 y.o. female, here for follow up  on management of history of right breast cancer.  Rivka has been doing well since we saw her last with no new complaints.  She has continued to make dietary changes, is eating healthier, this seemed take away a lot of the arthralgias she was having.  She has had no new or concerning findings on chest wall exam and no new or worrisome bony aches or pains.  Has intermittent pain in her right chest wall at the site of previous radiation, she states that this pain is fleeting, does not seem to be associated with any movement or activity, it has occurred since radiation and is not worsening over time or increasing in its intensity.  Has no unusual cough or shortness of breath.  Appetite is normal.  No change in her bowel or bladder habits.  She has had no surgeries or procedures.        Past Medical History:   Diagnosis Date   • Anesthesia     DIFFICULT TO AWAKEN WITH MOST RECENT SURGERY    • Arthritis    • Back pain    • Breast lump    • Breast pain    • Cataract    • Constipation    • Corneal burn     after chemotherapy   • Ductal carcinoma of right breast, stage 2 (CMS/HCC)     Stage yIIA; invasive   • Easy bruising    • Fatigue    • Fractures    • Glaucoma    • H/O mammogram 11/21/2013   • Headache    • Heart murmur    • History of transfusion    • Itching    • Migraine    • Palpitations    • PONV (postoperative nausea and vomiting)    • Positive TB test    • Pruritus     upper arms and chest wall   • Retinal detachment    • Seasonal allergies    • Sinus problem    • TIA (transient ischemic attack) 2008    POSSIBLE VS COMPLICATED MIGRAINE    • Upper arm pain     Right   • Wears glasses      Past Surgical History:   Procedure Laterality Date   • BREAST BIOPSY  11/2013   • BREAST IMPLANT SURGERY Bilateral 10/21/2016    Procedure:  Revision of bilateral breast with removal of redundant skin interfering with use of external prosthetic;  Surgeon: Amanda Samuel MD;  Location: [facility];  Service:    • MASTECTOMY  "Bilateral 05/13/2014   • PORTACATH PLACEMENT  12/2013   • GA EXCISE EXCESS SKIN TISSUE,ABDOMEN, ADD-ON N/A 10/21/2016    Procedure: ABDOMINOPLASTY;  Surgeon: Amanda Samuel MD;  Location: [facility];  Service: Plastics   • RETINAL DETACHMENT REPAIR Bilateral 07/2002   • SHOULDER ACROMIOPLASTY WITH ROTATOR CUFF REPAIR Right 01/1993   • TONSILLECTOMY  1969   • VENOUS ACCESS DEVICE (PORT) REMOVAL  09/2015       No Known Allergies    Family History and Social History reviewed and changed as necessary      REVIEW OF SYSTEM:   Review of Systems   Constitutional: Negative for appetite change, chills, diaphoresis, fatigue, fever and unexpected weight change.   HENT:   Negative for mouth sores, sore throat and trouble swallowing.    Eyes: Negative for icterus.   Respiratory: Negative for cough, hemoptysis and shortness of breath.    Cardiovascular: Negative for chest pain, leg swelling and palpitations.   Gastrointestinal: Negative for abdominal distention, abdominal pain, blood in stool, constipation, diarrhea, nausea and vomiting.   Endocrine: Negative for hot flashes.   Genitourinary: Negative for bladder incontinence, difficulty urinating, dysuria, frequency and hematuria.    Musculoskeletal: Negative for gait problem, neck pain and neck stiffness.   Skin: Negative for rash.   Neurological: Negative for dizziness, gait problem, headaches, light-headedness and numbness.   Hematological: Negative for adenopathy. Does not bruise/bleed easily.   Psychiatric/Behavioral: Negative for depression. The patient is not nervous/anxious.    All other systems reviewed and are negative.       PHYSICAL EXAM    Vitals:    03/04/19 1004   BP: 122/76   Pulse: 85   Resp: 16   Temp: 98 °F (36.7 °C)   SpO2: 94%   Weight: 80.3 kg (177 lb)   Height: 170.2 cm (67\")     Constitutional: Appears well-developed and well-nourished. No distress.   ECOG: (0) Fully active, able to carry on all predisease performance without restriction  HENT:   Head: " Normocephalic.   Mouth/Throat: Oropharynx is clear and moist.   Eyes: Conjunctivae are normal. Pupils are equal, round, and reactive to light. No scleral icterus.   Neck: Neck supple. No JVD present. No thyromegaly present.   Cardiovascular: Normal rate, regular rhythm and normal heart sounds.  Chest: Bilateral chest exam is benign status post mastectomies, skin tissue is soft, no abnormal masses, nodules, rashes or lesions.  No tenderness on palpation.  Nodes: No cervical, supraclavicular or axillary nodes palpable on exam.    Pulmonary/Chest: Breath sounds normal. No respiratory distress.   Abdominal: Soft. Exhibits no distension and no mass. There is no hepatosplenomegaly. There is no tenderness. There is no rebound and no guarding.   Musculoskeletal:Exhibits no edema, tenderness or deformity.   Neurological: Alert and oriented to person, place, and time. Exhibits normal muscle tone.   Skin: No ecchymosis, no petechiae and no rash noted. Not diaphoretic. No cyanosis. Nails show no clubbing.   Psychiatric: Normal mood and affect.   Vitals reviewed.  Labs reviewed.  Recent Results (from the past 168 hour(s))   Comprehensive Metabolic Panel    Collection Time: 03/04/19  9:50 AM   Result Value Ref Range    Glucose 79 70 - 100 mg/dL    BUN 22 9 - 23 mg/dL    Creatinine 0.90 0.60 - 1.30 mg/dL    Sodium 144 132 - 146 mmol/L    Potassium 4.6 3.5 - 5.5 mmol/L    Chloride 108 99 - 109 mmol/L    CO2 26.0 20.0 - 31.0 mmol/L    Calcium 9.0 8.7 - 10.4 mg/dL    Total Protein 5.9 5.7 - 8.2 g/dL    Albumin 4.02 3.20 - 4.80 g/dL    ALT (SGPT) 10 7 - 40 U/L    AST (SGOT) 15 0 - 33 U/L    Alkaline Phosphatase 77 25 - 100 U/L    Total Bilirubin 0.5 0.3 - 1.2 mg/dL    eGFR Non African Amer 65 >60 mL/min/1.73    Globulin 1.9 gm/dL    A/G Ratio 2.1 1.5 - 2.5 g/dL    BUN/Creatinine Ratio 24.4 7.0 - 25.0    Anion Gap 10.0 3.0 - 11.0 mmol/L       Diagnostic data: All previous office notes and current radiology reports and images thereof  were reviewed at time of visit with the patient.    Ct Chest With Contrast    Result Date: 2/15/2019  Stable chest CT scan including mild pulmonary interstitial changes and 3-4 mm right lower lobe pulmonary nodule. No new or progressive chest disease is identified.  DICTATED:   2/15/2019 EDITED/ls :   2/15/2019  This report was finalized on 2/15/2019 11:08 PM by DR. Yoandy Moreno MD.        Assessment/Plan     1.  History of right breast cancer  2.  Interstitial lung disease  3.  Small right lower lobe pulmonary nodule    Discussion: No evidence of disease on clinical exam and no new worrisome symptoms.  She is tolerating tamoxifen without difficulty.  She is up-to-date on routine pelvic exams.  She has had no spotting, remains amenorrheic.  Her CMP today is normal, we will continue to monitor annually while on tamoxifen.  We plan on continuing tamoxifen for 10 years of extended adjuvant therapy until July 2024.  Previous bone density test for baseline showed osteopenia in right femoral neck.  She will need that repeated next year.  CT scan stable with no new findings.  I will see her back in 1 year for follow-up and we will repeat her CT scan one more time and if it is still stable we will likely discontinue routine radiological follow-up in the absence of symptoms.    I spent 15 minutes with the patient. I spent > 50% percent of this time counseling and discussing prognosis, diagnostic testing, evaluation, current status and management.    Marcia Sarabia, FANTA    03/04/2019

## 2020-02-28 ENCOUNTER — HOSPITAL ENCOUNTER (OUTPATIENT)
Dept: CT IMAGING | Facility: HOSPITAL | Age: 60
Discharge: HOME | End: 2020-02-28
Admitting: NURSE PRACTITIONER

## 2020-02-28 ENCOUNTER — LAB (OUTPATIENT)
Dept: LAB | Facility: HOSPITAL | Age: 60
End: 2020-02-28

## 2020-02-28 DIAGNOSIS — J84.9 INTERSTITIAL LUNG DISEASE (HCC): ICD-10-CM

## 2020-02-28 DIAGNOSIS — Z85.3 HISTORY OF BREAST CANCER: ICD-10-CM

## 2020-02-28 DIAGNOSIS — R91.1 LUNG NODULE: ICD-10-CM

## 2020-02-28 DIAGNOSIS — Z79.899 HIGH RISK MEDICATIONS (NOT ANTICOAGULANTS) LONG-TERM USE: ICD-10-CM

## 2020-02-28 LAB
ALBUMIN SERPL-MCNC: 4 G/DL (ref 3.5–5.2)
ALBUMIN/GLOB SERPL: 1.5 G/DL
ALP SERPL-CCNC: 55 U/L (ref 39–117)
ALT SERPL W P-5'-P-CCNC: 10 U/L (ref 1–33)
ANION GAP SERPL CALCULATED.3IONS-SCNC: 10 MMOL/L (ref 5–15)
AST SERPL-CCNC: 17 U/L (ref 1–32)
BILIRUB SERPL-MCNC: 0.2 MG/DL (ref 0.2–1.2)
BUN BLD-MCNC: 16 MG/DL (ref 6–20)
BUN/CREAT SERPL: 17.4 (ref 7–25)
CALCIUM SPEC-SCNC: 9.2 MG/DL (ref 8.6–10.5)
CHLORIDE SERPL-SCNC: 106 MMOL/L (ref 98–107)
CO2 SERPL-SCNC: 25 MMOL/L (ref 22–29)
CREAT BLD-MCNC: 0.92 MG/DL (ref 0.57–1)
CREAT BLDA-MCNC: 0.9 MG/DL (ref 0.6–1.3)
GFR SERPL CREATININE-BSD FRML MDRD: 63 ML/MIN/1.73
GLOBULIN UR ELPH-MCNC: 2.6 GM/DL
GLUCOSE BLD-MCNC: 89 MG/DL (ref 65–99)
POTASSIUM BLD-SCNC: 4.2 MMOL/L (ref 3.5–5.2)
PROT SERPL-MCNC: 6.6 G/DL (ref 6–8.5)
SODIUM BLD-SCNC: 141 MMOL/L (ref 136–145)

## 2020-02-28 PROCEDURE — 82565 ASSAY OF CREATININE: CPT

## 2020-02-28 PROCEDURE — 80053 COMPREHEN METABOLIC PANEL: CPT

## 2020-02-28 PROCEDURE — 36415 COLL VENOUS BLD VENIPUNCTURE: CPT

## 2020-02-28 PROCEDURE — 25010000002 IOPAMIDOL 61 % SOLUTION: Performed by: NURSE PRACTITIONER

## 2020-02-28 PROCEDURE — 71260 CT THORAX DX C+: CPT

## 2020-02-28 RX ADMIN — IOPAMIDOL 85 ML: 612 INJECTION, SOLUTION INTRAVENOUS at 14:42

## 2020-03-03 ENCOUNTER — OFFICE VISIT (OUTPATIENT)
Dept: ONCOLOGY | Facility: CLINIC | Age: 60
End: 2020-03-03

## 2020-03-03 VITALS
HEART RATE: 76 BPM | TEMPERATURE: 97.2 F | DIASTOLIC BLOOD PRESSURE: 84 MMHG | SYSTOLIC BLOOD PRESSURE: 130 MMHG | WEIGHT: 164 LBS | HEIGHT: 67 IN | RESPIRATION RATE: 16 BRPM | OXYGEN SATURATION: 100 % | BODY MASS INDEX: 25.74 KG/M2

## 2020-03-03 DIAGNOSIS — Z13.820 OSTEOPOROSIS SCREENING: ICD-10-CM

## 2020-03-03 DIAGNOSIS — C50.211 MALIGNANT NEOPLASM OF UPPER-INNER QUADRANT OF RIGHT BREAST IN FEMALE, ESTROGEN RECEPTOR POSITIVE (HCC): ICD-10-CM

## 2020-03-03 DIAGNOSIS — R91.1 LUNG NODULE: ICD-10-CM

## 2020-03-03 DIAGNOSIS — Z00.00 HEALTHCARE MAINTENANCE: ICD-10-CM

## 2020-03-03 DIAGNOSIS — Z17.0 MALIGNANT NEOPLASM OF UPPER-INNER QUADRANT OF RIGHT BREAST IN FEMALE, ESTROGEN RECEPTOR POSITIVE (HCC): ICD-10-CM

## 2020-03-03 DIAGNOSIS — Z79.899 ENCOUNTER FOR LONG-TERM CURRENT USE OF HIGH RISK MEDICATION: ICD-10-CM

## 2020-03-03 DIAGNOSIS — Z78.0 POSTMENOPAUSAL: Primary | ICD-10-CM

## 2020-03-03 PROCEDURE — 99214 OFFICE O/P EST MOD 30 MIN: CPT | Performed by: NURSE PRACTITIONER

## 2020-03-03 RX ORDER — TAMOXIFEN CITRATE 20 MG/1
20 TABLET ORAL DAILY
Qty: 90 TABLET | Refills: 3 | Status: SHIPPED | OUTPATIENT
Start: 2020-03-03 | End: 2021-03-02 | Stop reason: SDUPTHER

## 2020-03-03 NOTE — PROGRESS NOTES
CHIEF COMPLAINT: Follow-up for breast cancer    Problem List:  Oncology/Hematology History    1. Clinical stage IIB T2N1 greater than 2 cm primary, ER positive, [place]  negative, HER-2/antonio 3+ breast cancer diagnosed on abnormal mammogram 11/21/2013  with biopsy that day. Received neoadjuvant dose dense Adriamycin and Cytoxan  starting 12/23/2013. Despite the use of Aloxi, Emend, Zofran, and Phenergan  she had significant and protracted nausea and vomiting that was quite  debilitating along with migraine headaches with severe visual scotoma. This  occurred after her 1st course of therapy on 12/23/2013, again after her 2nd  course 01/06/2014 and again after her 3rd course on 01/20/2014 of dose dense  Asael-Cytoxan. Hence, her chemotherapy was switched to Taxotere and Herceptin  starting 02/04/2014. After course #1, day 1 of Taxotere and Herceptin she  developed severe eye swelling to the point where her eyes were nearly shut with  almost a burn-like reaction on the dorsum and lateral aspects symmetrically of  her hands. This was interesting in that she also subsequently developed palmar  and plantar cracking and desquamation but despite continuing the Taxotere and  Herceptin the eye swelling did not recur but she had severe problems with  curling and loss of her eyelashes with conjunctivitis. She had started a dry  cough as well in mid-February 2014 that progressively worsened despite  Claritin, Prilosec, and Amoxil. She received course #2 of Taxotere and  Herceptin starting 03/04/2014 and course #3 on 03/25/2014 and developed such a  severe cough that subsequently CAT scan showed ground-glass opacities in the  lungs, treated with antibiotics, steroids with prednisone, and cessation of  chemo such that by the time of CT 05/06/2014 her ground-glass opacities had  mostly resolved save for some mild micronodular interstitial changes in the  lower lobes persistent but much better than prior CAT scan of 04/10/2014. She  did  have some collateralization of subclavian blood vessel flow due to stenosis  of the subclavian also on that CAT scan:   a) Due to the toxicities relating to her therapy, we just went ahead with  surgery given that she had had an excellent shrinkage of her tumor that was  well larger than 2 cm in size but virtually nonpalpable after the first course  of chemotherapy. She hence underwent right mastectomy with prophylactic left  mastectomy on 05/13/2014. Returned on 06/10/2014 for postoperative followup.   Had pathological [id number] and 0.8 cm right breast residual, Friedman-Huff 6  out of 9 and 0 out of 2 sentinel lymph nodes on the right with benign left  prophylactic mastectomy.  b) Began maintenance Herceptin 06/17/2014.   c) Most recent echocardiogram 05/27/2014 with ejection fraction 55% to 65%.  d) Herceptin weekly fairly well tolerated. Herceptin on hold since 10/07/2014  due to persistent cough and ruling out pneumonitis that could possibly be  caused by Herceptin.   e) Completed consolidative irradiation 09/09/2014.  f) Completed Herceptin 07/28/2015.  g) Began adjuvant tamoxifen 07/2014.  h) Bone scan 11/09/2015 showing some uptake in the right 6th rib anteriorly,  an area of prior surgery, most likely benign and CT chest 02/16/2016 showing  noncalcified 4 mm nodule right lung stable with stable scar in the lung.   2. Probable Taxotere-induced pneumonitis subsequently resolved:   a) Had ground glass abnormalities 04/10/2014 improving and virtually resolved  by 05/06/14 but by 10/2014 had new right upper lobe nodules that were faintly  PET-positive but by 12/2014 had resolved.  b) CT of the chest 09/28/2015 revealed no change in the small pleural based  nodular density in the right midlung anteriorly. Findings suggest  postradiation change.  3. Screening colonoscopy 10/2014 with hyperplastic polyp.        Malignant neoplasm of upper-inner quadrant of right female breast (CMS/HCC)    11/21/2013 Initial  Diagnosis     Right breast cancer      8/21/2017 Imaging     CT chest abdomen pelvis with contrast  Impression:     1. Stable scarring in the chest.  2. Over the interval of one year, slight increase in stranding in the  subcutis of the right anterior and anterior lateral chest wall. Please  correlate for significance.  3. Otherwise, progressive pulmonary parenchymal or mediastinal  abnormality is not identif           8/31/2017 Imaging     MRI brain negative.      2/22/2018 Imaging     CT chest IMPRESSION:  1. Mild chronic appearing interstitial lung changes including trace new  interstitial disease in the right upper lobe. No findings particularly  worrisome for metastatic disease are appreciated.  2. Appearance of a catheter or catheter fragment along the left  mediastinal margin, with no evidence of any associated abnormality.  Please correlate with the patient's history.      3/12/2018 Imaging     DEXA bone density testing   RESULTS:     Lumbar Spine:  The BMD measured in the L1-L4 region is 1.040 g/cm2.  The  average T-score is -0.1.  The Z-score is 1.1.     Total Hip:  The BMD measured at the left total proximal femur is 0.886  g/cm2.  The T-score is -0.5.  The Z-score is 0.3.     Femoral Neck:  The BMD measured at the left femoral neck is 0.785 g/cm2.   The T-score is -0.6.  The Z-score is 0.5.     Total Hip:  The BMD measured at the right total proximal femur is 0.838  g/cm2.  The T-score is -0.9.  The Z-score is -0.1.     Femoral neck: The BMD measured at the right femoral neck is 0.719 g/cm2.  The T score is -1.2. The Z score is -0.1.     IMPRESSION:  Osteopenia of the right femoral neck.      2/15/2019 Imaging     CT chest IMPRESSION:  Stable chest CT scan including mild pulmonary interstitial  changes and 3-4 mm right lower lobe pulmonary nodule. No new or  progressive chest disease is identified.      2/28/2020 Imaging     CT chest IMPRESSION:  Nodular density identified anteriorly within the  right  middle lobe which is stable and unchanged when compared to the prior  study. There is no new parenchymal consolidation with some scarring  identified at the right lung apex. Findings are all stable and  unchanged.         HISTORY OF PRESENT ILLNESS:  The patient is a 60 y.o. female, here for follow up on management of history of right breast cancer.  Rivka has been doing well since we saw her last with no new complaints.  She has had no new or concerning findings on chest wall exam and no new or worrisome bony aches or pains.  Has intermittent pain in her right chest wall at the site of previous radiation, she states that this pain is fleeting, does not seem to be associated with any movement or activity, it has occurred since radiation and is not worsening over time or increasing in its intensity.  Has no unusual cough or shortness of breath.  Appetite is normal.  No change in her bowel or bladder habits.  She is up-to-date on routine screening colonoscopy, last was October 2014 with recommendation for 10-year follow-up.      Past Medical History:   Diagnosis Date   • Anesthesia     DIFFICULT TO AWAKEN WITH MOST RECENT SURGERY    • Arthritis    • Back pain    • Breast lump    • Breast pain    • Cataract    • Constipation    • Corneal burn     after chemotherapy   • Ductal carcinoma of right breast, stage 2 (CMS/HCC)     Stage yIIA; invasive   • Easy bruising    • Fatigue    • Fractures    • Glaucoma    • H/O mammogram 11/21/2013   • Headache    • Heart murmur    • History of transfusion    • Itching    • Migraine    • Palpitations    • PONV (postoperative nausea and vomiting)    • Positive TB test    • Pruritus     upper arms and chest wall   • Retinal detachment    • Seasonal allergies    • Sinus problem    • TIA (transient ischemic attack) 2008    POSSIBLE VS COMPLICATED MIGRAINE    • Upper arm pain     Right   • Wears glasses      Past Surgical History:   Procedure Laterality Date   • BREAST BIOPSY   11/2013   • BREAST IMPLANT SURGERY Bilateral 10/21/2016    Procedure:  Revision of bilateral breast with removal of redundant skin interfering with use of external prosthetic;  Surgeon: Amanda Samuel MD;  Location: [facility] OR;  Service:    • MASTECTOMY Bilateral 05/13/2014   • PORTACATH PLACEMENT  12/2013   • HI EXCISE EXCESS SKIN TISSUE,ABDOMEN, ADD-ON N/A 10/21/2016    Procedure: ABDOMINOPLASTY;  Surgeon: Amanda Samuel MD;  Location: [facility] OR;  Service: Plastics   • RETINAL DETACHMENT REPAIR Bilateral 07/2002   • SHOULDER ACROMIOPLASTY WITH ROTATOR CUFF REPAIR Right 01/1993   • TONSILLECTOMY  1969   • VENOUS ACCESS DEVICE (PORT) REMOVAL  09/2015       No Known Allergies    Family History and Social History reviewed and changed as necessary      REVIEW OF SYSTEM:   Review of Systems   Constitutional: Negative for appetite change, chills, diaphoresis, fatigue, fever and unexpected weight change.   HENT:   Negative for mouth sores, sore throat and trouble swallowing.    Eyes: Negative for icterus.   Respiratory: Negative for cough, hemoptysis and shortness of breath.    Cardiovascular: Negative for chest pain, leg swelling and palpitations.   Gastrointestinal: Negative for abdominal distention, abdominal pain, blood in stool, constipation, diarrhea, nausea and vomiting.   Endocrine: Negative for hot flashes.   Genitourinary: Negative for bladder incontinence, difficulty urinating, dysuria, frequency and hematuria.    Musculoskeletal: Negative for gait problem, neck pain and neck stiffness.   Skin: Negative for rash.   Neurological: Negative for dizziness, gait problem, headaches, light-headedness and numbness.   Hematological: Negative for adenopathy. Does not bruise/bleed easily.   Psychiatric/Behavioral: Negative for depression. The patient is not nervous/anxious.    All other systems reviewed and are negative.       PHYSICAL EXAM    Vitals:    03/03/20 0959   BP: 130/84   Pulse: 76   Resp: 16   Temp:  "97.2 °F (36.2 °C)   TempSrc: Temporal   SpO2: 100%   Weight: 74.4 kg (164 lb)   Height: 170.2 cm (67\")     Constitutional: Appears well-developed and well-nourished. No distress.   ECOG: (0) Fully active, able to carry on all predisease performance without restriction  HENT:   Head: Normocephalic.   Mouth/Throat: Oropharynx is clear and moist.   Eyes: Conjunctivae are normal. Pupils are equal, round, and reactive to light. No scleral icterus.   Neck: Neck supple. No JVD present.    Cardiovascular: Normal rate, regular rhythm and normal heart sounds.  Chest: Bilateral chest exam is benign status post mastectomies, skin tissue is soft, no abnormal masses, nodules, rashes or lesions.  No tenderness on palpation.  Nodes: No cervical, supraclavicular or axillary nodes palpable on exam.    Pulmonary/Chest: Breath sounds normal. No respiratory distress.   Abdominal: Soft. Exhibits no distension. There is no tenderness.   Musculoskeletal:Exhibits no edema, tenderness or deformity.   Neurological: Alert and oriented to person, place, and time. Exhibits normal muscle tone.   Skin: No ecchymosis, no petechiae and no rash noted. Not diaphoretic. No cyanosis. Nails show no clubbing.   Psychiatric: Normal mood and affect.   Vitals reviewed.  Labs reviewed.    Recent Results (from the past 168 hour(s))   POC Creatinine    Collection Time: 02/28/20  2:30 PM   Result Value Ref Range    Creatinine 0.90 0.60 - 1.30 mg/dL   Comprehensive Metabolic Panel    Collection Time: 02/28/20  2:38 PM   Result Value Ref Range    Glucose 89 65 - 99 mg/dL    BUN 16 6 - 20 mg/dL    Creatinine 0.92 0.57 - 1.00 mg/dL    Sodium 141 136 - 145 mmol/L    Potassium 4.2 3.5 - 5.2 mmol/L    Chloride 106 98 - 107 mmol/L    CO2 25.0 22.0 - 29.0 mmol/L    Calcium 9.2 8.6 - 10.5 mg/dL    Total Protein 6.6 6.0 - 8.5 g/dL    Albumin 4.00 3.50 - 5.20 g/dL    ALT (SGPT) 10 1 - 33 U/L    AST (SGOT) 17 1 - 32 U/L    Alkaline Phosphatase 55 39 - 117 U/L    Total " Bilirubin 0.2 0.2 - 1.2 mg/dL    eGFR Non African Amer 63 >60 mL/min/1.73    Globulin 2.6 gm/dL    A/G Ratio 1.5 g/dL    BUN/Creatinine Ratio 17.4 7.0 - 25.0    Anion Gap 10.0 5.0 - 15.0 mmol/L       Diagnostic data: All previous office notes and current radiology reports and images thereof were reviewed at time of visit with the patient.    Ct Chest With Contrast    Result Date: 3/2/2020  Nodular density identified anteriorly within the right middle lobe which is stable and unchanged when compared to the prior study. There is no new parenchymal consolidation with some scarring identified at the right lung apex. Findings are all stable and unchanged.  DICTATED:   02/29/2020 EDITED/ls :   02/29/2020  This report was finalized on 3/2/2020 6:36 PM by Dr. Ashley Lomeli MD.        Assessment/Plan     1.  History of right breast cancer  2.  Interstitial lung disease  3.  Right lung nodular density, stable over time    Discussion: No evidence of disease on clinical exam and no new worrisome symptoms.  She is tolerating tamoxifen without difficulty.  I sent a prescription for tamoxifen, 20 mg daily, #90 with 3 refills today to her pharmacy.  She is up-to-date on routine pelvic exams.  She has had no spotting, remains amenorrheic.  Her CMP is normal, we will continue to monitor annually while on tamoxifen.  We plan on continuing tamoxifen for 10 years of extended adjuvant therapy until July 2024.  Previous bone density test for baseline showed osteopenia in right femoral neck, we will repeat that in the upcoming months and I have ordered that today.  CT scan stable with no new findings.  I will refer her to [facility] for further recommendations on how long to follow with scans as it has been stable for the past few years.  I also have put in a referral to our internal medicine patient connection hub as she is in need of a new primary care provider.  I will see her back in 1 year for follow-up and certainly  sooner if she has any new concerns.      I spent a total of 25 minutes in direct patient care, greater than 18  minutes (greater than 50%) were spent in coordination of care, and counseling the patient regarding  (diagnosis) . Answered any questions patient had regarding medications and plan of care.     Marcia Sarabia, APRN    03/03/2020

## 2020-03-25 ENCOUNTER — APPOINTMENT (OUTPATIENT)
Dept: BONE DENSITY | Facility: HOSPITAL | Age: 60
End: 2020-03-25

## 2021-03-02 ENCOUNTER — LAB (OUTPATIENT)
Dept: LAB | Facility: HOSPITAL | Age: 61
End: 2021-03-02

## 2021-03-02 ENCOUNTER — OFFICE VISIT (OUTPATIENT)
Dept: ONCOLOGY | Facility: CLINIC | Age: 61
End: 2021-03-02

## 2021-03-02 VITALS
BODY MASS INDEX: 27 KG/M2 | TEMPERATURE: 97.7 F | HEART RATE: 83 BPM | HEIGHT: 67 IN | WEIGHT: 172 LBS | RESPIRATION RATE: 16 BRPM | DIASTOLIC BLOOD PRESSURE: 67 MMHG | SYSTOLIC BLOOD PRESSURE: 124 MMHG | OXYGEN SATURATION: 98 %

## 2021-03-02 DIAGNOSIS — R91.1 LUNG NODULE: ICD-10-CM

## 2021-03-02 DIAGNOSIS — Z17.0 MALIGNANT NEOPLASM OF UPPER-INNER QUADRANT OF RIGHT BREAST IN FEMALE, ESTROGEN RECEPTOR POSITIVE (HCC): ICD-10-CM

## 2021-03-02 DIAGNOSIS — C50.211 MALIGNANT NEOPLASM OF UPPER-INNER QUADRANT OF RIGHT BREAST IN FEMALE, ESTROGEN RECEPTOR POSITIVE (HCC): ICD-10-CM

## 2021-03-02 DIAGNOSIS — Z79.810 LONG-TERM CURRENT USE OF TAMOXIFEN: ICD-10-CM

## 2021-03-02 DIAGNOSIS — Z13.820 OSTEOPOROSIS SCREENING: ICD-10-CM

## 2021-03-02 DIAGNOSIS — Z78.0 POST-MENOPAUSAL: ICD-10-CM

## 2021-03-02 DIAGNOSIS — Z79.899 ENCOUNTER FOR LONG-TERM CURRENT USE OF HIGH RISK MEDICATION: ICD-10-CM

## 2021-03-02 DIAGNOSIS — R07.89 LEFT-SIDED CHEST WALL PAIN: ICD-10-CM

## 2021-03-02 DIAGNOSIS — C50.211 MALIGNANT NEOPLASM OF UPPER-INNER QUADRANT OF RIGHT BREAST IN FEMALE, ESTROGEN RECEPTOR POSITIVE (HCC): Primary | ICD-10-CM

## 2021-03-02 DIAGNOSIS — Z17.0 MALIGNANT NEOPLASM OF UPPER-INNER QUADRANT OF RIGHT BREAST IN FEMALE, ESTROGEN RECEPTOR POSITIVE (HCC): Primary | ICD-10-CM

## 2021-03-02 DIAGNOSIS — Z85.3 HISTORY OF BREAST CANCER: ICD-10-CM

## 2021-03-02 LAB
ALBUMIN SERPL-MCNC: 4 G/DL (ref 3.5–5.2)
ALBUMIN/GLOB SERPL: 1.5 G/DL
ALP SERPL-CCNC: 68 U/L (ref 39–117)
ALT SERPL W P-5'-P-CCNC: 10 U/L (ref 1–33)
ANION GAP SERPL CALCULATED.3IONS-SCNC: 6 MMOL/L (ref 5–15)
AST SERPL-CCNC: 20 U/L (ref 1–32)
BILIRUB SERPL-MCNC: 0.3 MG/DL (ref 0–1.2)
BUN SERPL-MCNC: 13 MG/DL (ref 6–20)
BUN/CREAT SERPL: 14.1 (ref 7–25)
CALCIUM SPEC-SCNC: 9.5 MG/DL (ref 8.6–10.5)
CHLORIDE SERPL-SCNC: 109 MMOL/L (ref 98–107)
CO2 SERPL-SCNC: 27 MMOL/L (ref 22–29)
CREAT SERPL-MCNC: 0.92 MG/DL (ref 0.57–1)
GFR SERPL CREATININE-BSD FRML MDRD: 62 ML/MIN/1.73
GLOBULIN UR ELPH-MCNC: 2.6 GM/DL
GLUCOSE SERPL-MCNC: 101 MG/DL (ref 65–99)
POTASSIUM SERPL-SCNC: 3.9 MMOL/L (ref 3.5–5.2)
PROT SERPL-MCNC: 6.6 G/DL (ref 6–8.5)
SODIUM SERPL-SCNC: 142 MMOL/L (ref 136–145)

## 2021-03-02 PROCEDURE — 80053 COMPREHEN METABOLIC PANEL: CPT

## 2021-03-02 PROCEDURE — 99215 OFFICE O/P EST HI 40 MIN: CPT | Performed by: NURSE PRACTITIONER

## 2021-03-02 PROCEDURE — 36415 COLL VENOUS BLD VENIPUNCTURE: CPT

## 2021-03-02 RX ORDER — TAMOXIFEN CITRATE 20 MG/1
20 TABLET ORAL DAILY
Qty: 90 TABLET | Refills: 3 | Status: SHIPPED | OUTPATIENT
Start: 2021-03-02 | End: 2021-03-03 | Stop reason: SDUPTHER

## 2021-03-02 NOTE — PROGRESS NOTES
CHIEF COMPLAINT:  1.  Anxiety  2.  Left chest wall pain  3.      Problem List:  Oncology/Hematology History Overview Note   1. Right breast cancer:  clinical stage IIB T2N1 greater than 2 cm primary, ER positive, [place]  negative, HER-2/antonio 3+ breast cancer diagnosed on abnormal mammogram 11/21/2013  with biopsy that day. Received neoadjuvant dose dense Adriamycin and Cytoxan  starting 12/23/2013. Despite the use of Aloxi, Emend, Zofran, and Phenergan  she had significant and protracted nausea and vomiting that was quite  debilitating along with migraine headaches with severe visual scotoma. This  occurred after her 1st course of therapy on 12/23/2013, again after her 2nd  course 01/06/2014 and again after her 3rd course on 01/20/2014 of dose dense  Asael-Cytoxan. Hence, her chemotherapy was switched to Taxotere and Herceptin  starting 02/04/2014. After course #1, day 1 of Taxotere and Herceptin she  developed severe eye swelling to the point where her eyes were nearly shut with  almost a burn-like reaction on the dorsum and lateral aspects symmetrically of  her hands. This was interesting in that she also subsequently developed palmar  and plantar cracking and desquamation but despite continuing the Taxotere and  Herceptin the eye swelling did not recur but she had severe problems with  curling and loss of her eyelashes with conjunctivitis. She had started a dry  cough as well in mid-February 2014 that progressively worsened despite  Claritin, Prilosec, and Amoxil. She received course #2 of Taxotere and  Herceptin starting 03/04/2014 and course #3 on 03/25/2014 and developed such a  severe cough that subsequently CAT scan showed ground-glass opacities in the  lungs, treated with antibiotics, steroids with prednisone, and cessation of  chemo such that by the time of CT 05/06/2014 her ground-glass opacities had  mostly resolved save for some mild micronodular interstitial changes in the  lower lobes persistent but much  better than prior CAT scan of 04/10/2014. She  did have some collateralization of subclavian blood vessel flow due to stenosis  of the subclavian also on that CAT scan:   a) Due to the toxicities relating to her therapy, we just went ahead with  surgery given that she had had an excellent shrinkage of her tumor that was  well larger than 2 cm in size but virtually nonpalpable after the first course  of chemotherapy. She hence underwent right mastectomy with prophylactic left  mastectomy on 05/13/2014. Returned on 06/10/2014 for postoperative followup.   Had pathological [id number] and 0.8 cm right breast residual, Friedman-Huff 6  out of 9 and 0 out of 2 sentinel lymph nodes on the right with benign left  prophylactic mastectomy.  b) Began maintenance Herceptin 06/17/2014.   c) Most recent echocardiogram 05/27/2014 with ejection fraction 55% to 65%.  d) Herceptin weekly fairly well tolerated. Herceptin on hold since 10/07/2014  due to persistent cough and ruling out pneumonitis that could possibly be  caused by Herceptin.   e) Completed consolidative irradiation 09/09/2014.  f) Completed Herceptin 07/28/2015.  g) Began adjuvant tamoxifen 07/2014.  h) Bone scan 11/09/2015 showing some uptake in the right 6th rib anteriorly,  an area of prior surgery, most likely benign and CT chest 02/16/2016 showing  noncalcified 4 mm nodule right lung stable with stable scar in the lung.   2. Probable Taxotere-induced pneumonitis subsequently resolved:   a) Had ground glass abnormalities 04/10/2014 improving and virtually resolved  by 05/06/14 but by 10/2014 had new right upper lobe nodules that were faintly  PET-positive but by 12/2014 had resolved.  b) CT of the chest 09/28/2015 revealed no change in the small pleural based  nodular density in the right midlung anteriorly. Findings suggest  postradiation change.  3. Screening colonoscopy 10/2014 with hyperplastic polyp.     Malignant neoplasm of upper-inner quadrant of right  female breast (CMS/HCC)   11/21/2013 Initial Diagnosis    Right breast cancer     8/21/2017 Imaging    CT chest abdomen pelvis with contrast  Impression:     1. Stable scarring in the chest.  2. Over the interval of one year, slight increase in stranding in the  subcutis of the right anterior and anterior lateral chest wall. Please  correlate for significance.  3. Otherwise, progressive pulmonary parenchymal or mediastinal  abnormality is not identif          8/31/2017 Imaging    MRI brain negative.     2/22/2018 Imaging    CT chest IMPRESSION:  1. Mild chronic appearing interstitial lung changes including trace new  interstitial disease in the right upper lobe. No findings particularly  worrisome for metastatic disease are appreciated.  2. Appearance of a catheter or catheter fragment along the left  mediastinal margin, with no evidence of any associated abnormality.  Please correlate with the patient's history.     3/12/2018 Imaging    DEXA bone density testing   RESULTS:     Lumbar Spine:  The BMD measured in the L1-L4 region is 1.040 g/cm2.  The  average T-score is -0.1.  The Z-score is 1.1.     Total Hip:  The BMD measured at the left total proximal femur is 0.886  g/cm2.  The T-score is -0.5.  The Z-score is 0.3.     Femoral Neck:  The BMD measured at the left femoral neck is 0.785 g/cm2.   The T-score is -0.6.  The Z-score is 0.5.     Total Hip:  The BMD measured at the right total proximal femur is 0.838  g/cm2.  The T-score is -0.9.  The Z-score is -0.1.     Femoral neck: The BMD measured at the right femoral neck is 0.719 g/cm2.  The T score is -1.2. The Z score is -0.1.     IMPRESSION:  Osteopenia of the right femoral neck.     2/15/2019 Imaging    CT chest IMPRESSION:  Stable chest CT scan including mild pulmonary interstitial  changes and 3-4 mm right lower lobe pulmonary nodule. No new or  progressive chest disease is identified.     2/28/2020 Imaging    CT chest IMPRESSION:  Nodular density identified  anteriorly within the right  middle lobe which is stable and unchanged when compared to the prior  study. There is no new parenchymal consolidation with some scarring  identified at the right lung apex. Findings are all stable and  unchanged.         HISTORY OF PRESENT ILLNESS:  The patient is a 61 y.o. female, here for follow up on management of ER+, HER-2/antonio+ right breast cancer, completed treatment as above in the oncology history, currently on adjuvant therapy with Tamoxifen.  Rivka reports that she has been having increasing anxiety lately.  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  She is working on trying to decrease work stress and is looking into natural therapies for her anxiety.    She is also having more persistent left upper chest wall discomfort, she describes as an ache, it occurs intermittently, it does not radiate.  No associated cough or shortness of breath.  Appetite is normal, no nausea, weight stable.  No neurological concerns.    She did not keep any of the referral appointments we arranged last year due to COVID-19 pandemic.  She is not interested in ever taking the COVID vaccine.     Past Medical History:   Diagnosis Date   • Anesthesia     DIFFICULT TO AWAKEN WITH MOST RECENT SURGERY    • Arthritis    • Back pain    • Breast lump    • Breast pain    • Cataract    • Constipation    • Corneal burn     after chemotherapy   • Ductal carcinoma of right breast, stage 2 (CMS/HCC)     Stage yIIA; invasive   • Easy bruising    • Fatigue    • Fractures    • Glaucoma    • H/O mammogram 11/21/2013   • Headache    • Heart murmur    • History of transfusion    • Itching    • Migraine    • Palpitations    • PONV (postoperative nausea and vomiting)    • Positive TB test    • Pruritus     upper arms and chest wall   • Retinal detachment    • Seasonal allergies    • Sinus problem    • TIA (transient ischemic  "attack) 2008    POSSIBLE VS COMPLICATED MIGRAINE    • Upper arm pain     Right   • Wears glasses      Past Surgical History:   Procedure Laterality Date   • BREAST BIOPSY  11/2013   • BREAST IMPLANT SURGERY Bilateral 10/21/2016    Procedure:  Revision of bilateral breast with removal of redundant skin interfering with use of external prosthetic;  Surgeon: Amanda Samuel MD;  Location:  [facility];  Service:    • MASTECTOMY Bilateral 05/13/2014   • PORTACATH PLACEMENT  12/2013   • OR EXCISE EXCESS SKIN TISSUE,ABDOMEN, ADD-ON N/A 10/21/2016    Procedure: ABDOMINOPLASTY;  Surgeon: Amanda Samuel MD;  Location:  [facility];  Service: Plastics   • RETINAL DETACHMENT REPAIR Bilateral 07/2002   • SHOULDER ACROMIOPLASTY WITH ROTATOR CUFF REPAIR Right 01/1993   • TONSILLECTOMY  1969   • VENOUS ACCESS DEVICE (PORT) REMOVAL  09/2015       No Known Allergies    Family History and Social History reviewed and changed as necessary    REVIEW OF SYSTEM:   Positive for intermittent left upper chest wall pain.    PHYSICAL EXAM:  General:  Well developed, well nourished female in no distress.  Chest:  Bilateral chest wall exam benign, no abnormal nodules, rashes or lesions.  She is s/p bilateral mastectomies.  Nodes:  No cervical, supraclavicular or axillary lymph nodes palpable on exam.  Respiratory:  Lungs CTA bilaterally, respirations even and unlabored.  Cardiac:  Regular rate and rhythm, no murmur, gallop or rub.    Vitals:    03/02/21 0953   BP: 124/67   Pulse: 83   Resp: 16   Temp: 97.7 °F (36.5 °C)   SpO2: 98%   Weight: 78 kg (172 lb)   Height: 170.2 cm (67\")     Vitals:    03/02/21 0953   PainSc: 0-No pain          ECOG score: 0           Vitals reviewed.        Lab Results   Component Value Date    GLUCOSE 101 (H) 03/02/2021    BUN 13 03/02/2021    CREATININE 0.92 03/02/2021     03/02/2021    K 3.9 03/02/2021     (H) 03/02/2021    CO2 27.0 03/02/2021    CALCIUM 9.5 03/02/2021    PROTEINTOT 6.6 03/02/2021    " ALBUMIN 4.00 03/02/2021    BILITOT 0.3 03/02/2021    ALKPHOS 68 03/02/2021    AST 20 03/02/2021    ALT 10 03/02/2021             ASSESSMENT & PLAN:  1.  ER+ Right breast cancer:  No evidence of disease on clinical exam.  She is tolerating tamoxifen with no unusual side effects.  We plan on 10 years extended adjuvant therapy until July 2024.  We are checking liver enzymes annually while on tamoxifen and they have remained normal.  Rx sent for her Tamoxifen.  Will get bone density as she was unable to do last year due to pandemic, I have reordered.    I also reminded her to get in with Dr. Tanner for her annual pelvic exam as it is overdue, again due to pandemic.    Return to clinic in 1 year for follow up.    2.  Chest wall pain   3.  History of lung nodule:  In light of her left chest wall pain along with history of stable lung nodule I will get CT chest for further evaluation.  She did not go to lung nodule clinic last year due to pandemic.  If CT negative then won't push.      4.  Anxiety:  I encouraged and offered referral for counseling however Rivka declines for now.  She will let me know if she changes her mind.    5.  Health maintenance:  She is in need of PCP, I had referred her last year but she canceled due to pandemic.  She will contact them to reschedule.    I spent 40 minutes caring for Rivka on this date of service. This time includes time spent by me in the following activities: preparing for the visit, performing a medically appropriate examination and/or evaluation, counseling and educating the patient/family/caregiver, ordering medications, tests, or procedures and documenting information in the medical record.     Marcia Sarabia, APRN    03/02/2021

## 2021-03-03 DIAGNOSIS — C50.211 MALIGNANT NEOPLASM OF UPPER-INNER QUADRANT OF RIGHT BREAST IN FEMALE, ESTROGEN RECEPTOR POSITIVE (HCC): ICD-10-CM

## 2021-03-03 DIAGNOSIS — Z17.0 MALIGNANT NEOPLASM OF UPPER-INNER QUADRANT OF RIGHT BREAST IN FEMALE, ESTROGEN RECEPTOR POSITIVE (HCC): ICD-10-CM

## 2021-03-03 RX ORDER — TAMOXIFEN CITRATE 20 MG/1
20 TABLET ORAL DAILY
Qty: 90 TABLET | Refills: 3 | Status: SHIPPED | OUTPATIENT
Start: 2021-03-03 | End: 2022-03-04 | Stop reason: SDUPTHER

## 2021-03-03 NOTE — TELEPHONE ENCOUNTER
PATIENT CALLING    [facility] WILL NO LONGER FILL HER TAMOXIFEN, SHE NEEDS THE SCRIPT SENT TO [facility] #[phone], SHE STILL WANTS A 90 DAY SUPPLY, PLEASE ADVISE?    PT CALL BACK# [phone]

## 2021-03-05 ENCOUNTER — HOSPITAL ENCOUNTER (OUTPATIENT)
Dept: BONE DENSITY | Facility: HOSPITAL | Age: 61
Discharge: HOME | End: 2021-03-05
Admitting: NURSE PRACTITIONER

## 2021-03-05 DIAGNOSIS — Z78.0 POST-MENOPAUSAL: ICD-10-CM

## 2021-03-05 DIAGNOSIS — Z13.820 OSTEOPOROSIS SCREENING: ICD-10-CM

## 2021-03-05 PROCEDURE — 77080 DXA BONE DENSITY AXIAL: CPT

## 2021-03-17 ENCOUNTER — HOSPITAL ENCOUNTER (OUTPATIENT)
Dept: CT IMAGING | Facility: HOSPITAL | Age: 61
Discharge: HOME | End: 2021-03-17
Admitting: NURSE PRACTITIONER

## 2021-03-17 DIAGNOSIS — R91.1 LUNG NODULE: ICD-10-CM

## 2021-03-17 DIAGNOSIS — R07.89 LEFT-SIDED CHEST WALL PAIN: ICD-10-CM

## 2021-03-17 DIAGNOSIS — Z85.3 HISTORY OF BREAST CANCER: ICD-10-CM

## 2021-03-17 PROCEDURE — 25010000002 IOPAMIDOL 61 % SOLUTION: Performed by: NURSE PRACTITIONER

## 2021-03-17 PROCEDURE — 71260 CT THORAX DX C+: CPT

## 2021-03-17 RX ADMIN — IOPAMIDOL 95 ML: 612 INJECTION, SOLUTION INTRAVENOUS at 08:29

## 2021-03-23 ENCOUNTER — TELEPHONE (OUTPATIENT)
Dept: ONCOLOGY | Facility: CLINIC | Age: 61
End: 2021-03-23

## 2021-03-23 NOTE — TELEPHONE ENCOUNTER
Per Marcia, Called patient and left detailed message informing her CT scan showed no new findings. Left call back number for any additional questions.

## 2021-03-23 NOTE — TELEPHONE ENCOUNTER
Caller: NATALIIA LOPEZ     Relationship: SELF    Best call back number: [phone]    Caller requesting test results:  YES    What test was performed: CT OF CHEST     When was the test performed: 3/17/21    Where was the test performed: [facility]     Additional notes:  JUST CURIOUS IF THIS HAS BEEN READ YET.

## 2022-03-04 ENCOUNTER — OFFICE VISIT (OUTPATIENT)
Dept: ONCOLOGY | Facility: CLINIC | Age: 62
End: 2022-03-04

## 2022-03-04 VITALS
BODY MASS INDEX: 28.28 KG/M2 | HEIGHT: 66 IN | DIASTOLIC BLOOD PRESSURE: 65 MMHG | OXYGEN SATURATION: 97 % | TEMPERATURE: 97.4 F | RESPIRATION RATE: 16 BRPM | HEART RATE: 83 BPM | SYSTOLIC BLOOD PRESSURE: 139 MMHG | WEIGHT: 176 LBS

## 2022-03-04 DIAGNOSIS — Z79.899 ENCOUNTER FOR LONG-TERM (CURRENT) USE OF HIGH-RISK MEDICATION: ICD-10-CM

## 2022-03-04 DIAGNOSIS — C50.211 MALIGNANT NEOPLASM OF UPPER-INNER QUADRANT OF RIGHT BREAST IN FEMALE, ESTROGEN RECEPTOR POSITIVE: Primary | ICD-10-CM

## 2022-03-04 DIAGNOSIS — Z17.0 MALIGNANT NEOPLASM OF UPPER-INNER QUADRANT OF RIGHT BREAST IN FEMALE, ESTROGEN RECEPTOR POSITIVE: Primary | ICD-10-CM

## 2022-03-04 PROCEDURE — 99214 OFFICE O/P EST MOD 30 MIN: CPT | Performed by: NURSE PRACTITIONER

## 2022-03-04 RX ORDER — TAMOXIFEN CITRATE 20 MG/1
20 TABLET ORAL DAILY
Qty: 90 TABLET | Refills: 3 | Status: SHIPPED | OUTPATIENT
Start: 2022-03-04

## 2022-03-04 NOTE — PROGRESS NOTES
CHIEF COMPLAINT: ER positive breast cancer    Problem List:  Oncology/Hematology History Overview Note   1. Right breast cancer:  clinical stage IIB T2N1 greater than 2 cm primary, ER positive, [place]  negative, HER-2/antonio 3+ breast cancer diagnosed on abnormal mammogram 11/21/2013  with biopsy that day. Received neoadjuvant dose dense Adriamycin and Cytoxan  starting 12/23/2013. Despite the use of Aloxi, Emend, Zofran, and Phenergan  she had significant and protracted nausea and vomiting that was quite  debilitating along with migraine headaches with severe visual scotoma. This  occurred after her 1st course of therapy on 12/23/2013, again after her 2nd  course 01/06/2014 and again after her 3rd course on 01/20/2014 of dose dense  Asael-Cytoxan. Hence, her chemotherapy was switched to Taxotere and Herceptin  starting 02/04/2014. After course #1, day 1 of Taxotere and Herceptin she  developed severe eye swelling to the point where her eyes were nearly shut with  almost a burn-like reaction on the dorsum and lateral aspects symmetrically of  her hands. This was interesting in that she also subsequently developed palmar  and plantar cracking and desquamation but despite continuing the Taxotere and  Herceptin the eye swelling did not recur but she had severe problems with  curling and loss of her eyelashes with conjunctivitis. She had started a dry  cough as well in mid-February 2014 that progressively worsened despite  Claritin, Prilosec, and Amoxil. She received course #2 of Taxotere and  Herceptin starting 03/04/2014 and course #3 on 03/25/2014 and developed such a  severe cough that subsequently CAT scan showed ground-glass opacities in the  lungs, treated with antibiotics, steroids with prednisone, and cessation of  chemo such that by the time of CT 05/06/2014 her ground-glass opacities had  mostly resolved save for some mild micronodular interstitial changes in the  lower lobes persistent but much better than prior  CAT scan of 04/10/2014. She  did have some collateralization of subclavian blood vessel flow due to stenosis  of the subclavian also on that CAT scan:   a) Due to the toxicities relating to her therapy, we just went ahead with  surgery given that she had had an excellent shrinkage of her tumor that was  well larger than 2 cm in size but virtually nonpalpable after the first course  of chemotherapy. She hence underwent right mastectomy with prophylactic left  mastectomy on 05/13/2014. Returned on 06/10/2014 for postoperative followup.   Had pathological [id number] and 0.8 cm right breast residual, Friedman-Huff 6  out of 9 and 0 out of 2 sentinel lymph nodes on the right with benign left  prophylactic mastectomy.  b) Began maintenance Herceptin 06/17/2014.   c) Most recent echocardiogram 05/27/2014 with ejection fraction 55% to 65%.  d) Herceptin weekly fairly well tolerated. Herceptin on hold since 10/07/2014  due to persistent cough and ruling out pneumonitis that could possibly be  caused by Herceptin.   e) Completed consolidative irradiation 09/09/2014.  f) Completed Herceptin 07/28/2015.  g) Began adjuvant tamoxifen 07/2014.  h) Bone scan 11/09/2015 showing some uptake in the right 6th rib anteriorly,  an area of prior surgery, most likely benign and CT chest 02/16/2016 showing  noncalcified 4 mm nodule right lung stable with stable scar in the lung.   2. Probable Taxotere-induced pneumonitis subsequently resolved:   a) Had ground glass abnormalities 04/10/2014 improving and virtually resolved  by 05/06/14 but by 10/2014 had new right upper lobe nodules that were faintly  PET-positive but by 12/2014 had resolved.  b) CT of the chest 09/28/2015 revealed no change in the small pleural based  nodular density in the right midlung anteriorly. Findings suggest  postradiation change.  3. Screening colonoscopy 10/2014 with hyperplastic polyp.     Malignant neoplasm of upper-inner quadrant of right female breast (HCC)    11/21/2013 Initial Diagnosis    Right breast cancer     8/21/2017 Imaging    CT chest abdomen pelvis with contrast  Impression:     1. Stable scarring in the chest.  2. Over the interval of one year, slight increase in stranding in the  subcutis of the right anterior and anterior lateral chest wall. Please  correlate for significance.  3. Otherwise, progressive pulmonary parenchymal or mediastinal  abnormality is not identif          8/31/2017 Imaging    MRI brain negative.     2/22/2018 Imaging    CT chest IMPRESSION:  1. Mild chronic appearing interstitial lung changes including trace new  interstitial disease in the right upper lobe. No findings particularly  worrisome for metastatic disease are appreciated.  2. Appearance of a catheter or catheter fragment along the left  mediastinal margin, with no evidence of any associated abnormality.  Please correlate with the patient's history.     3/12/2018 Imaging    DEXA bone density testing   RESULTS:     Lumbar Spine:  The BMD measured in the L1-L4 region is 1.040 g/cm2.  The  average T-score is -0.1.  The Z-score is 1.1.     Total Hip:  The BMD measured at the left total proximal femur is 0.886  g/cm2.  The T-score is -0.5.  The Z-score is 0.3.     Femoral Neck:  The BMD measured at the left femoral neck is 0.785 g/cm2.   The T-score is -0.6.  The Z-score is 0.5.     Total Hip:  The BMD measured at the right total proximal femur is 0.838  g/cm2.  The T-score is -0.9.  The Z-score is -0.1.     Femoral neck: The BMD measured at the right femoral neck is 0.719 g/cm2.  The T score is -1.2. The Z score is -0.1.     IMPRESSION:  Osteopenia of the right femoral neck.     2/15/2019 Imaging    CT chest IMPRESSION:  Stable chest CT scan including mild pulmonary interstitial  changes and 3-4 mm right lower lobe pulmonary nodule. No new or  progressive chest disease is identified.     2/28/2020 Imaging    CT chest IMPRESSION:  Nodular density identified anteriorly within the  right  middle lobe which is stable and unchanged when compared to the prior  study. There is no new parenchymal consolidation with some scarring  identified at the right lung apex. Findings are all stable and  unchanged.     3/5/2021 Imaging    DEXA bone density IMPRESSION:  Bone mineral density results are within normal limits.         HISTORY OF PRESENT ILLNESS:  The patient is a 62 y.o. female, here for follow up on management of ER positive right breast cancer currently on adjuvant therapy with tamoxifen.  Rivka has been doing well this past year with no new concerns.  She has been more active and exercising and feeling much better.  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  She remains quite busy and active.  She has no new or concerning bony aches or pains, no new or concerning findings on chest wall exam.  She is tolerating tamoxifen with no unusual side effects.    Past Medical History:   Diagnosis Date   • Anesthesia     DIFFICULT TO AWAKEN WITH MOST RECENT SURGERY    • Arthritis    • Back pain    • Breast lump    • Breast pain    • Cataract    • Constipation    • Corneal burn     after chemotherapy   • Ductal carcinoma of right breast, stage 2 (HCC)     Stage yIIA; invasive   • Easy bruising    • Fatigue    • Fractures    • Glaucoma    • H/O mammogram 11/21/2013   • Headache    • Heart murmur    • History of transfusion    • Itching    • Migraine    • Palpitations    • PONV (postoperative nausea and vomiting)    • Positive TB test    • Pruritus     upper arms and chest wall   • Retinal detachment    • Seasonal allergies    • Sinus problem    • TIA (transient ischemic attack) 2008    POSSIBLE VS COMPLICATED MIGRAINE    • Upper arm pain     Right   • Wears glasses      Past Surgical History:   Procedure Laterality Date   • BREAST BIOPSY  11/2013   • BREAST IMPLANT SURGERY Bilateral 10/21/2016    Procedure:  Revision of bilateral breast with removal of redundant skin interfering with use of external  "prosthetic;  Surgeon: Amanda Samuel MD;  Location:  [facility];  Service:    • MASTECTOMY Bilateral 05/13/2014   • PORTACATH PLACEMENT  12/2013   • [place] EXCISE EXCESS SKIN TISSUE,ABDOMEN, ADD-ON N/A 10/21/2016    Procedure: ABDOMINOPLASTY;  Surgeon: Amanda Samuel MD;  Location:  [facility];  Service: Plastics   • RETINAL DETACHMENT REPAIR Bilateral 07/2002   • SHOULDER ACROMIOPLASTY WITH ROTATOR CUFF REPAIR Right 01/1993   • TONSILLECTOMY  1969   • VENOUS ACCESS DEVICE (PORT) REMOVAL  09/2015       No Known Allergies    Family History and Social History reviewed and changed as necessary    REVIEW OF SYSTEM:   Negative for new concerns    PHYSICAL EXAM:  General: Well-developed, well-nourished healthy-appearing female in no distress  Nodes: No cervical, supraclavicular or axillary nodes palpable on exam  Chest: Chest wall exam is benign status post bilateral mastectomies, skin tissue is soft, no abnormal masses, nodules, rashes or lesions.    Vitals:    03/04/22 1257   BP: 139/65   Pulse: 83   Resp: 16   Temp: 97.4 °F (36.3 °C)   SpO2: 97%   Weight: 79.8 kg (176 lb)   Height: 167.6 cm (66\")     Vitals:    03/04/22 1257   PainSc: 0-No pain          ECOG score: 0           Vitals reviewed.    ECOG: (0) Fully Active - Able to Carry On All Pre-disease Performance Without Restriction        ASSESSMENT & PLAN:    1.  ER positive right breast cancer as outlined above in the oncology history currently on extended adjuvant therapy with tamoxifen: Rivka is doing well on tamoxifen with no new concerns.  We plan on 10 years extended adjuvant therapy until July 2024.  Prescription was sent today for refill with 1 year supply.  We did do a bone density test last year that was normal.  She does need to get back in with Dr. Tanner for annual pelvic exam as it is overdue and I reminded her again today of the importance of that particularly since she is on tamoxifen which can cause endometrial thickening and an increase in her " risk of endometrial cancers.  She has had no bleeding or spotting.  She states understanding.    2.  History of lung nodule: She has history of stable lung nodule over the years, we last did CT of the chest in March 2021 that showed stable noncalcified right lower lobe pulmonary nodule a 5 mm.  This has been stable over time, no plans for further imaging in the absence of new symptoms.    3.  Health maintenance: She still is in need of a primary care provider, she does use the urgent care clinic if she has any concerns but I discussed with her the importance of routine health maintenance such as checking her cholesterol, making sure she is up-to-date with routine screenings and immunizations etc.  She states understanding.    Return to clinic in 1 year for follow-up    I spent 30 minutes caring for Rivka on this date of service. This time includes time spent by me in the following activities: preparing for the visit, reviewing tests, performing a medically appropriate examination and/or evaluation, counseling and educating the patient/family/caregiver, ordering medications, tests, or procedures and documenting information in the medical record.     Marcia Sarabia, APRN    03/04/2022

## 2024-03-02 ENCOUNTER — HOSPITAL ENCOUNTER (EMERGENCY)
Facility: HOSPITAL | Age: 64
Discharge: HOME | End: 2024-03-02
Attending: EMERGENCY MEDICINE
Payer: COMMERCIAL

## 2024-03-02 ENCOUNTER — APPOINTMENT (OUTPATIENT)
Dept: CT IMAGING | Facility: HOSPITAL | Age: 64
End: 2024-03-02
Payer: COMMERCIAL

## 2024-03-02 VITALS
WEIGHT: 169 LBS | BODY MASS INDEX: 27.16 KG/M2 | HEIGHT: 66 IN | DIASTOLIC BLOOD PRESSURE: 77 MMHG | OXYGEN SATURATION: 99 % | RESPIRATION RATE: 16 BRPM | SYSTOLIC BLOOD PRESSURE: 151 MMHG | TEMPERATURE: 98 F | HEART RATE: 65 BPM

## 2024-03-02 DIAGNOSIS — R10.9 LEFT FLANK PAIN: ICD-10-CM

## 2024-03-02 DIAGNOSIS — N20.0 KIDNEY STONE: Primary | ICD-10-CM

## 2024-03-02 LAB
ALBUMIN SERPL-MCNC: 4.6 G/DL (ref 3.5–5.2)
ALBUMIN/GLOB SERPL: 1.6 G/DL
ALP SERPL-CCNC: 114 U/L (ref 39–117)
ALT SERPL W P-5'-P-CCNC: 12 U/L (ref 1–33)
ANION GAP SERPL CALCULATED.3IONS-SCNC: 11 MMOL/L (ref 5–15)
AST SERPL-CCNC: 18 U/L (ref 1–32)
BACTERIA UR QL AUTO: ABNORMAL /HPF
BASOPHILS # BLD AUTO: 0.02 10*3/MM3 (ref 0–0.2)
BASOPHILS NFR BLD AUTO: 0.3 % (ref 0–1.5)
BILIRUB SERPL-MCNC: 0.5 MG/DL (ref 0–1.2)
BILIRUB UR QL STRIP: NEGATIVE
BUN SERPL-MCNC: 19 MG/DL (ref 8–23)
BUN/CREAT SERPL: 21.8 (ref 7–25)
CALCIUM SPEC-SCNC: 9.7 MG/DL (ref 8.6–10.5)
CHLORIDE SERPL-SCNC: 107 MMOL/L (ref 98–107)
CLARITY UR: CLEAR
CO2 SERPL-SCNC: 20 MMOL/L (ref 22–29)
COLOR UR: YELLOW
CREAT SERPL-MCNC: 0.87 MG/DL (ref 0.57–1)
DEPRECATED RDW RBC AUTO: 45.6 FL (ref 37–54)
EGFRCR SERPLBLD CKD-EPI 2021: 75.4 ML/MIN/1.73
EOSINOPHIL # BLD AUTO: 0.01 10*3/MM3 (ref 0–0.4)
EOSINOPHIL NFR BLD AUTO: 0.1 % (ref 0.3–6.2)
ERYTHROCYTE [DISTWIDTH] IN BLOOD BY AUTOMATED COUNT: 12 % (ref 12.3–15.4)
GLOBULIN UR ELPH-MCNC: 2.9 GM/DL
GLUCOSE SERPL-MCNC: 116 MG/DL (ref 65–99)
GLUCOSE UR STRIP-MCNC: NEGATIVE MG/DL
HCT VFR BLD AUTO: 46.3 % (ref 34–46.6)
HGB BLD-MCNC: 15.7 G/DL (ref 12–15.9)
HGB UR QL STRIP.AUTO: ABNORMAL
HYALINE CASTS UR QL AUTO: ABNORMAL /LPF
IMM GRANULOCYTES # BLD AUTO: 0.02 10*3/MM3 (ref 0–0.05)
IMM GRANULOCYTES NFR BLD AUTO: 0.3 % (ref 0–0.5)
KETONES UR QL STRIP: NEGATIVE
LEUKOCYTE ESTERASE UR QL STRIP.AUTO: NEGATIVE
LIPASE SERPL-CCNC: 9 U/L (ref 13–60)
LYMPHOCYTES # BLD AUTO: 1.3 10*3/MM3 (ref 0.7–3.1)
LYMPHOCYTES NFR BLD AUTO: 16.8 % (ref 19.6–45.3)
MCH RBC QN AUTO: 34.4 PG (ref 26.6–33)
MCHC RBC AUTO-ENTMCNC: 33.9 G/DL (ref 31.5–35.7)
MCV RBC AUTO: 101.5 FL (ref 79–97)
MONOCYTES # BLD AUTO: 0.3 10*3/MM3 (ref 0.1–0.9)
MONOCYTES NFR BLD AUTO: 3.9 % (ref 5–12)
NEUTROPHILS NFR BLD AUTO: 6.07 10*3/MM3 (ref 1.7–7)
NEUTROPHILS NFR BLD AUTO: 78.6 % (ref 42.7–76)
NITRITE UR QL STRIP: NEGATIVE
NRBC BLD AUTO-RTO: 0 /100 WBC (ref 0–0.2)
PH UR STRIP.AUTO: 5.5 [PH] (ref 5–8)
PLATELET # BLD AUTO: 238 10*3/MM3 (ref 140–450)
PMV BLD AUTO: 9.3 FL (ref 6–12)
POTASSIUM SERPL-SCNC: 3.6 MMOL/L (ref 3.5–5.2)
PROT SERPL-MCNC: 7.5 G/DL (ref 6–8.5)
PROT UR QL STRIP: NEGATIVE
RBC # BLD AUTO: 4.56 10*6/MM3 (ref 3.77–5.28)
RBC # UR STRIP: ABNORMAL /HPF
REF LAB TEST METHOD: ABNORMAL
SODIUM SERPL-SCNC: 138 MMOL/L (ref 136–145)
SP GR UR STRIP: 1.02 (ref 1–1.03)
SQUAMOUS #/AREA URNS HPF: ABNORMAL /HPF
UROBILINOGEN UR QL STRIP: ABNORMAL
WBC # UR STRIP: ABNORMAL /HPF
WBC NRBC COR # BLD AUTO: 7.72 10*3/MM3 (ref 3.4–10.8)

## 2024-03-02 PROCEDURE — 99284 EMERGENCY DEPT VISIT MOD MDM: CPT

## 2024-03-02 PROCEDURE — 74176 CT ABD & PELVIS W/O CONTRAST: CPT

## 2024-03-02 PROCEDURE — 80053 COMPREHEN METABOLIC PANEL: CPT | Performed by: EMERGENCY MEDICINE

## 2024-03-02 PROCEDURE — 83690 ASSAY OF LIPASE: CPT | Performed by: EMERGENCY MEDICINE

## 2024-03-02 PROCEDURE — 81001 URINALYSIS AUTO W/SCOPE: CPT | Performed by: EMERGENCY MEDICINE

## 2024-03-02 PROCEDURE — 25810000003 SODIUM CHLORIDE 0.9 % SOLUTION: Performed by: EMERGENCY MEDICINE

## 2024-03-02 PROCEDURE — 85025 COMPLETE CBC W/AUTO DIFF WBC: CPT | Performed by: EMERGENCY MEDICINE

## 2024-03-02 RX ORDER — TRAMADOL HYDROCHLORIDE 50 MG/1
50 TABLET ORAL EVERY 8 HOURS PRN
Qty: 20 TABLET | Refills: 0 | Status: SHIPPED | OUTPATIENT
Start: 2024-03-02 | End: 2024-03-02

## 2024-03-02 RX ORDER — PROMETHAZINE HYDROCHLORIDE 25 MG/1
25 TABLET ORAL EVERY 8 HOURS PRN
Qty: 20 TABLET | Refills: 0 | Status: SHIPPED | OUTPATIENT
Start: 2024-03-02

## 2024-03-02 RX ORDER — TRAMADOL HYDROCHLORIDE 50 MG/1
50 TABLET ORAL EVERY 8 HOURS PRN
Qty: 20 TABLET | Refills: 0 | Status: SHIPPED | OUTPATIENT
Start: 2024-03-02

## 2024-03-02 RX ORDER — HYDROMORPHONE HYDROCHLORIDE 1 MG/ML
0.5 INJECTION, SOLUTION INTRAMUSCULAR; INTRAVENOUS; SUBCUTANEOUS ONCE
Status: DISCONTINUED | OUTPATIENT
Start: 2024-03-02 | End: 2024-03-02 | Stop reason: HOSPADM

## 2024-03-02 RX ORDER — SODIUM CHLORIDE 0.9 % (FLUSH) 0.9 %
10 SYRINGE (ML) INJECTION AS NEEDED
Status: DISCONTINUED | OUTPATIENT
Start: 2024-03-02 | End: 2024-03-02 | Stop reason: HOSPADM

## 2024-03-02 RX ORDER — PROMETHAZINE HYDROCHLORIDE 25 MG/1
25 TABLET ORAL EVERY 8 HOURS PRN
Qty: 20 TABLET | Refills: 0 | Status: SHIPPED | OUTPATIENT
Start: 2024-03-02 | End: 2024-03-02

## 2024-03-02 RX ORDER — ONDANSETRON 2 MG/ML
4 INJECTION INTRAMUSCULAR; INTRAVENOUS ONCE
Status: DISCONTINUED | OUTPATIENT
Start: 2024-03-02 | End: 2024-03-02 | Stop reason: HOSPADM

## 2024-03-02 RX ORDER — TAMSULOSIN HYDROCHLORIDE 0.4 MG/1
1 CAPSULE ORAL DAILY
Qty: 30 CAPSULE | Refills: 0 | Status: SHIPPED | OUTPATIENT
Start: 2024-03-02 | End: 2024-03-02

## 2024-03-02 RX ORDER — TAMSULOSIN HYDROCHLORIDE 0.4 MG/1
1 CAPSULE ORAL DAILY
Qty: 30 CAPSULE | Refills: 0 | Status: SHIPPED | OUTPATIENT
Start: 2024-03-02

## 2024-03-02 RX ADMIN — SODIUM CHLORIDE 500 ML: 9 INJECTION, SOLUTION INTRAVENOUS at 18:36

## 2024-03-03 NOTE — ED PROVIDER NOTES
Subjective   History of Present Illness  64-year-old female presents for evaluation of left flank pain.  The patient tells me that this morning at around 11 AM she began experiencing left flank pain that has persisted since that time.  The pain is coming in waves and is waxing and waning in severity.  No rash.  No fevers.  She currently rates her pain at 6 out of 10 in severity.  She denies any preceding injury or trauma to her back.  No bowel or bladder incontinence or retention.  No IV drug use.       Review of Systems   Genitourinary:  Positive for flank pain.   All other systems reviewed and are negative.      Past Medical History:   Diagnosis Date    Anesthesia     DIFFICULT TO AWAKEN WITH MOST RECENT SURGERY     Arthritis     Back pain     Breast lump     Breast pain     Cataract     Constipation     Corneal burn     after chemotherapy    Ductal carcinoma of right breast, stage 2     Stage yIIA; invasive    Easy bruising     Fatigue     Fractures     Glaucoma     H/O mammogram 11/21/2013    Headache     Heart murmur     History of transfusion     Itching     Migraine     Palpitations     PONV (postoperative nausea and vomiting)     Positive TB test     Pruritus     upper arms and chest wall    Retinal detachment     Seasonal allergies     Sinus problem     TIA (transient ischemic attack) 2008    POSSIBLE VS COMPLICATED MIGRAINE     Upper arm pain     Right    Wears glasses        No Known Allergies    Past Surgical History:   Procedure Laterality Date    BREAST BIOPSY  11/2013    BREAST IMPLANT SURGERY Bilateral 10/21/2016    Procedure:  Revision of bilateral breast with removal of redundant skin interfering with use of external prosthetic;  Surgeon: Amanda Samuel MD;  Location: [facility];  Service:     MASTECTOMY Bilateral 05/13/2014    PORTACATH PLACEMENT  12/2013    MN EXCISION EXCESSIVE SKIN & SUBQ TISSUE ABDOMEN N/A 10/21/2016    Procedure: ABDOMINOPLASTY;  Surgeon: Amanda Samuel MD;  Location:   [facility];  Service: Plastics    RETINAL DETACHMENT REPAIR Bilateral 07/2002    SHOULDER ACROMIOPLASTY WITH ROTATOR CUFF REPAIR Right 01/1993    TONSILLECTOMY  1969    VENOUS ACCESS DEVICE (PORT) REMOVAL  09/2015       Family History   Problem Relation Age of Onset    Lymphoma Mother     Melanoma Father        Social History     Socioeconomic History    Marital status: Single   Tobacco Use    Smoking status: Never    Smokeless tobacco: Never   Substance and Sexual Activity    Alcohol use: No    Drug use: No    Sexual activity: Defer           Objective   Physical Exam  Vitals and nursing note reviewed.   Constitutional:       General: She is not in acute distress.     Appearance: Normal appearance. She is well-developed. She is not diaphoretic.      Comments: Nontoxic-appearing female   HENT:      Head: Normocephalic and atraumatic.   Eyes:      Pupils: Pupils are equal, round, and reactive to light.   Cardiovascular:      Rate and Rhythm: Normal rate and regular rhythm.      Heart sounds: Normal heart sounds. No murmur heard.     No friction rub. No gallop.   Pulmonary:      Effort: Pulmonary effort is normal. No respiratory distress.      Breath sounds: Normal breath sounds. No wheezing or rales.   Abdominal:      General: Bowel sounds are normal. There is no distension.      Palpations: Abdomen is soft. There is no mass.      Tenderness: There is no abdominal tenderness. There is no guarding or rebound.      Comments: No focal abdominal tenderness, no peritoneal signs, no pain out of proportion to exam   Genitourinary:     Comments: No CVA tenderness noted  Musculoskeletal:         General: Normal range of motion.   Skin:     General: Skin is warm and dry.      Findings: No erythema or rash.      Comments: No dermatomal rash noted   Neurological:      Mental Status: She is alert and oriented to person, place, and time.   Psychiatric:         Mood and Affect: Mood normal.         Thought Content: Thought content  normal.         Judgment: Judgment normal.         Procedures           ED Course  ED Course as of 03/02/24 2147   Sat Mar 02, 2024   1845 64-year-old female presents for evaluation of left flank pain.  She tells me that this morning around 11 AM she began experiencing left flank pain that has persisted since that time.  The pain is coming in waves and is waxing and waning in severity.  On arrival to the ED, the patient is nontoxic-appearing.  Nonsurgical abdomen.  No CVA tenderness noted.  No dermatomal rash present.  Broad differential diagnosis.  We will obtain labs and imaging, and we will reassess following initial interventions. [DD]   1846 I personally and independently reviewed the patient's CT images and findings, and I am in agreement with the radiologist regarding CT interpretation--particular regarding nonobstructing kidney stones. [DD]   1941 Labs are bland/unrevealing. [DD]   1942 Upon reevaluation, the patient looks and feels markedly improved.  Reassured and counseled regarding symptomatic management.  Prescription for Ultram, Zofran, and Flomax.  I refer the patient to Dr. Costa of urology, and she will follow-up as needed if she fails conservative measures in the coming days.  Agreeable with plan and given appropriate strict return precautions. [DD]      ED Course User Index  [DD] Marcell Mcdonald MD                                   Recent Results (from the past 24 hour(s))   Comprehensive Metabolic Panel    Collection Time: 03/02/24  6:36 PM    Specimen: Blood   Result Value Ref Range    Glucose 116 (H) 65 - 99 mg/dL    BUN 19 8 - 23 mg/dL    Creatinine 0.87 0.57 - 1.00 mg/dL    Sodium 138 136 - 145 mmol/L    Potassium 3.6 3.5 - 5.2 mmol/L    Chloride 107 98 - 107 mmol/L    CO2 20.0 (L) 22.0 - 29.0 mmol/L    Calcium 9.7 8.6 - 10.5 mg/dL    Total Protein 7.5 6.0 - 8.5 g/dL    Albumin 4.6 3.5 - 5.2 g/dL    ALT (SGPT) 12 1 - 33 U/L    AST (SGOT) 18 1 - 32 U/L    Alkaline Phosphatase 114 39 -  117 U/L    Total Bilirubin 0.5 0.0 - 1.2 mg/dL    Globulin 2.9 gm/dL    A/G Ratio 1.6 g/dL    BUN/Creatinine Ratio 21.8 7.0 - 25.0    Anion Gap 11.0 5.0 - 15.0 mmol/L    eGFR 75.4 >60.0 mL/min/1.73   Lipase    Collection Time: 03/02/24  6:36 PM    Specimen: Blood   Result Value Ref Range    Lipase 9 (L) 13 - 60 U/L   Urinalysis With Culture If Indicated - Urine, Clean Catch    Collection Time: 03/02/24  6:36 PM    Specimen: Urine, Clean Catch   Result Value Ref Range    Color, UA Yellow Yellow, Straw    Appearance, UA Clear Clear    pH, UA 5.5 5.0 - 8.0    Specific Gravity, UA 1.020 1.001 - 1.030    Glucose, UA Negative Negative    Ketones, UA Negative Negative    Bilirubin, UA Negative Negative    Blood, UA Small (1+) (A) Negative    Protein, UA Negative Negative    Leuk Esterase, UA Negative Negative    Nitrite, UA Negative Negative    Urobilinogen, UA 0.2 E.U./dL 0.2 - 1.0 E.U./dL   CBC Auto Differential    Collection Time: 03/02/24  6:36 PM    Specimen: Blood   Result Value Ref Range    WBC 7.72 3.40 - 10.80 10*3/mm3    RBC 4.56 3.77 - 5.28 10*6/mm3    Hemoglobin 15.7 12.0 - 15.9 g/dL    Hematocrit 46.3 34.0 - 46.6 %    .5 (H) 79.0 - 97.0 fL    MCH 34.4 (H) 26.6 - 33.0 pg    MCHC 33.9 31.5 - 35.7 g/dL    RDW 12.0 (L) 12.3 - 15.4 %    RDW-SD 45.6 37.0 - 54.0 fl    MPV 9.3 6.0 - 12.0 fL    Platelets 238 140 - 450 10*3/mm3    Neutrophil % 78.6 (H) 42.7 - 76.0 %    Lymphocyte % 16.8 (L) 19.6 - 45.3 %    Monocyte % 3.9 (L) 5.0 - 12.0 %    Eosinophil % 0.1 (L) 0.3 - 6.2 %    Basophil % 0.3 0.0 - 1.5 %    Immature Grans % 0.3 0.0 - 0.5 %    Neutrophils, Absolute 6.07 1.70 - 7.00 10*3/mm3    Lymphocytes, Absolute 1.30 0.70 - 3.10 10*3/mm3    Monocytes, Absolute 0.30 0.10 - 0.90 10*3/mm3    Eosinophils, Absolute 0.01 0.00 - 0.40 10*3/mm3    Basophils, Absolute 0.02 0.00 - 0.20 10*3/mm3    Immature Grans, Absolute 0.02 0.00 - 0.05 10*3/mm3    nRBC 0.0 0.0 - 0.2 /100 WBC   Urinalysis, Microscopic Only - Urine, Clean  "Catch    Collection Time: 03/02/24  6:36 PM    Specimen: Urine, Clean Catch   Result Value Ref Range    RBC, UA 3-5 (A) None Seen, 0-2 /HPF    WBC, UA 3-5 (A) None Seen, 0-2 /HPF    Bacteria, UA None Seen None Seen, Trace /HPF    Squamous Epithelial Cells, UA 0-2 None Seen, 0-2 /HPF    Hyaline Casts, UA 0-6 0 - 6 /LPF    Methodology Automated Microscopy      Note: In addition to lab results from this visit, the labs listed above may include labs taken at another facility or during a different encounter within the last 24 hours. Please correlate lab times with ED admission and discharge times for further clarification of the services performed during this visit.    CT Abdomen Pelvis Without Contrast   Final Result   Impression:   1. Small bilateral nonobstructing renal stones measuring up to 4 mm on the right and 2 mm on the left. No associated hydronephrosis.                     Electronically Signed: Brad Bazzi     3/2/2024 6:31 PM EST     Workstation ID: [id number]        Vitals:    03/02/24 1731 03/02/24 1951   BP: 154/93 151/77   BP Location: Left arm    Patient Position: Sitting    Pulse: 87 65   Resp: 20 16   Temp: 98 °F (36.7 °C)    TempSrc: Oral    SpO2: 96% 99%   Weight: 76.7 kg (169 lb)    Height: 167.6 cm (66\")      Medications   sodium chloride 0.9 % flush 10 mL (has no administration in time range)   HYDROmorphone (DILAUDID) injection 0.5 mg (0 mg Intravenous Hold 3/2/24 1842)   ondansetron (ZOFRAN) injection 4 mg (0 mg Intravenous Hold 3/2/24 1842)   sodium chloride 0.9 % bolus 500 mL (0 mL Intravenous Stopped 3/2/24 2001)     ECG/EMG Results (last 24 hours)       ** No results found for the last 24 hours. **          No orders to display                 Medical Decision Making  Problems Addressed:  Kidney stone: complicated acute illness or injury  Left flank pain: complicated acute illness or injury    Amount and/or Complexity of Data Reviewed  Labs: ordered.  Radiology: " ordered.    Risk  Prescription drug management.        Final diagnoses:   Kidney stone   Left flank pain       ED Disposition  ED Disposition       ED Disposition   Discharge    Condition   Stable    Comment   --               Jevon Costa Jr., MD  [address]  [address]  [place] [id number] [phone]      As needed         Medication List        New Prescriptions      promethazine 25 MG tablet  Commonly known as: PHENERGAN  Take 1 tablet by mouth Every 8 (Eight) Hours As Needed for Nausea or Vomiting.     tamsulosin 0.4 MG capsule 24 hr capsule  Commonly known as: FLOMAX  Take 1 capsule by mouth Daily.     traMADol 50 MG tablet  Commonly known as: ULTRAM  Take 1 tablet by mouth Every 8 (Eight) Hours As Needed for Moderate Pain.               Where to Get Your Medications        These medications were sent to [id number] - [place], [place] - [address] AT [facility] - [phone]  - [phone] FX  [address], [facility] [address]      Phone: [phone]   promethazine 25 MG tablet  tamsulosin 0.4 MG capsule 24 hr capsule  traMADol 50 MG tablet            Marcell Mcdonald MD  03/02/24 6027